# Patient Record
Sex: FEMALE | Employment: PART TIME | URBAN - METROPOLITAN AREA
[De-identification: names, ages, dates, MRNs, and addresses within clinical notes are randomized per-mention and may not be internally consistent; named-entity substitution may affect disease eponyms.]

---

## 2019-11-05 ENCOUNTER — HOSPITAL ENCOUNTER (OUTPATIENT)
Age: 29
Setting detail: SPECIMEN
Discharge: HOME | End: 2019-11-05
Payer: MEDICAID

## 2019-11-05 DIAGNOSIS — R53.83 FATIGUE, UNSPECIFIED TYPE: ICD-10-CM

## 2019-11-05 PROBLEM — Z97.5 IUD (INTRAUTERINE DEVICE) IN PLACE: Status: ACTIVE | Noted: 2018-08-23

## 2019-11-05 PROBLEM — Z86.19 HISTORY OF CHICKEN POX: Status: ACTIVE | Noted: 2017-07-03

## 2019-11-05 LAB
ABSOLUTE EOS #: 0.08 K/UL (ref 0–0.44)
ABSOLUTE IMMATURE GRANULOCYTE: <0.03 K/UL (ref 0–0.3)
ABSOLUTE LYMPH #: 1.89 K/UL (ref 1.1–3.7)
ABSOLUTE MONO #: 0.48 K/UL (ref 0.1–1.2)
BASOPHILS # BLD: 1 % (ref 0–2)
BASOPHILS ABSOLUTE: 0.03 K/UL (ref 0–0.2)
DIFFERENTIAL TYPE: ABNORMAL
EOSINOPHILS RELATIVE PERCENT: 1 % (ref 1–4)
HCT VFR BLD CALC: 42.7 % (ref 36.3–47.1)
HEMOGLOBIN: 13.6 G/DL (ref 11.9–15.1)
IMMATURE GRANULOCYTES: 0 %
LYMPHOCYTES # BLD: 34 % (ref 24–43)
MCH RBC QN AUTO: 31.1 PG (ref 25.2–33.5)
MCHC RBC AUTO-ENTMCNC: 31.9 G/DL (ref 28.4–34.8)
MCV RBC AUTO: 97.7 FL (ref 82.6–102.9)
MONOCYTES # BLD: 9 % (ref 3–12)
NRBC AUTOMATED: 0 PER 100 WBC
PDW BLD-RTO: 11 % (ref 11.8–14.4)
PLATELET # BLD: 233 K/UL (ref 138–453)
PLATELET ESTIMATE: ABNORMAL
PMV BLD AUTO: 11.7 FL (ref 8.1–13.5)
RBC # BLD: 4.37 M/UL (ref 3.95–5.11)
RBC # BLD: ABNORMAL 10*6/UL
SEG NEUTROPHILS: 55 % (ref 36–65)
SEGMENTED NEUTROPHILS ABSOLUTE COUNT: 3.04 K/UL (ref 1.5–8.1)
TSH SERPL DL<=0.05 MIU/L-ACNC: 0.51 MIU/L (ref 0.3–5)
VITAMIN D 25-HYDROXY: 45.2 NG/ML (ref 30–100)
WBC # BLD: 5.5 K/UL (ref 3.5–11.3)
WBC # BLD: ABNORMAL 10*3/UL

## 2019-11-06 LAB — VITAMIN B-12: 514 PG/ML (ref 232–1245)

## 2019-11-07 LAB — EBV EARLY ANTIGEN IGG: 63 U/ML

## 2021-12-06 LAB — PAP SMEAR, EXTERNAL: NEGATIVE

## 2023-07-19 LAB — MAMMOGRAPHY, EXTERNAL: NORMAL

## 2023-08-16 ENCOUNTER — HOSPITAL ENCOUNTER (OUTPATIENT)
Age: 33
Discharge: HOME | End: 2023-08-16

## 2023-08-16 PROCEDURE — 86481 TB AG RESPONSE T-CELL SUSP: CPT

## 2023-08-16 PROCEDURE — 86787 VARICELLA-ZOSTER ANTIBODY: CPT

## 2023-08-16 PROCEDURE — 86735 MUMPS ANTIBODY: CPT

## 2023-08-16 PROCEDURE — 86762 RUBELLA ANTIBODY: CPT

## 2023-08-16 PROCEDURE — 86765 RUBEOLA ANTIBODY: CPT

## 2023-08-17 LAB — RUBV IGG SERPL QL IA: 18.89 IU/ML

## 2023-08-18 LAB
MEV IGG SER-ACNC: 4.35
MUV IGG SER IA-ACNC: 4.48
VZV IGG SER QL IA: 2.64

## 2023-08-21 LAB — T-SPOT TB TEST: NORMAL

## 2023-09-27 LAB
CHOLEST SERPL-MCNC: 223 MG/DL (ref 0–199)
CHOLESTEROL/HDL RATIO: 3
GLUCOSE SERPL-MCNC: 91 MG/DL (ref 70–99)
HDLC SERPL-MCNC: 79 MG/DL (ref 0–40)
LDLC SERPL CALC-MCNC: 131 MG/DL (ref 0–100)
PATIENT FASTING?: YES
TRIGL SERPL-MCNC: 64 MG/DL (ref 0–149)
VLDLC SERPL CALC-MCNC: 13 MG/DL

## 2023-11-27 ENCOUNTER — NURSE TRIAGE (OUTPATIENT)
Dept: OTHER | Facility: CLINIC | Age: 33
End: 2023-11-27

## 2023-11-27 NOTE — TELEPHONE ENCOUNTER
Kaleigh Boudreaux  1990    Callback to employee reporting COVID status. Add associate health test results in workday  Quarantine 5 days from onset of symptoms  Strict Mask adherence 5 days after  May reuturn if symptoms are improving and without fever for 24 hours without use of fever reducing medication.

## 2024-01-24 ENCOUNTER — OFFICE VISIT (OUTPATIENT)
Dept: FAMILY MEDICINE CLINIC | Age: 34
End: 2024-01-24
Payer: COMMERCIAL

## 2024-01-24 VITALS
DIASTOLIC BLOOD PRESSURE: 74 MMHG | BODY MASS INDEX: 21 KG/M2 | TEMPERATURE: 98.9 F | SYSTOLIC BLOOD PRESSURE: 130 MMHG | WEIGHT: 123 LBS | HEIGHT: 64 IN | OXYGEN SATURATION: 98 % | HEART RATE: 82 BPM

## 2024-01-24 DIAGNOSIS — M25.512 CHRONIC LEFT SHOULDER PAIN: ICD-10-CM

## 2024-01-24 DIAGNOSIS — G89.29 CHRONIC LEFT SHOULDER PAIN: ICD-10-CM

## 2024-01-24 DIAGNOSIS — B35.9 TINEA: ICD-10-CM

## 2024-01-24 DIAGNOSIS — S16.1XXA STRAIN OF NECK MUSCLE, INITIAL ENCOUNTER: ICD-10-CM

## 2024-01-24 DIAGNOSIS — M25.552 PAIN OF LEFT HIP: Primary | ICD-10-CM

## 2024-01-24 PROCEDURE — 99203 OFFICE O/P NEW LOW 30 MIN: CPT | Performed by: PHYSICIAN ASSISTANT

## 2024-01-24 RX ORDER — OMEGA-3 FATTY ACIDS CAP DELAYED RELEASE 1000 MG 1000 MG
3000 CAPSULE DELAYED RELEASE ORAL
COMMUNITY

## 2024-01-24 SDOH — ECONOMIC STABILITY: HOUSING INSECURITY
IN THE LAST 12 MONTHS, WAS THERE A TIME WHEN YOU DID NOT HAVE A STEADY PLACE TO SLEEP OR SLEPT IN A SHELTER (INCLUDING NOW)?: NO

## 2024-01-24 SDOH — ECONOMIC STABILITY: FOOD INSECURITY: WITHIN THE PAST 12 MONTHS, YOU WORRIED THAT YOUR FOOD WOULD RUN OUT BEFORE YOU GOT MONEY TO BUY MORE.: NEVER TRUE

## 2024-01-24 SDOH — ECONOMIC STABILITY: INCOME INSECURITY: HOW HARD IS IT FOR YOU TO PAY FOR THE VERY BASICS LIKE FOOD, HOUSING, MEDICAL CARE, AND HEATING?: NOT HARD AT ALL

## 2024-01-24 SDOH — ECONOMIC STABILITY: FOOD INSECURITY: WITHIN THE PAST 12 MONTHS, THE FOOD YOU BOUGHT JUST DIDN'T LAST AND YOU DIDN'T HAVE MONEY TO GET MORE.: NEVER TRUE

## 2024-01-24 ASSESSMENT — PATIENT HEALTH QUESTIONNAIRE - PHQ9
2. FEELING DOWN, DEPRESSED OR HOPELESS: 0
SUM OF ALL RESPONSES TO PHQ QUESTIONS 1-9: 0
SUM OF ALL RESPONSES TO PHQ QUESTIONS 1-9: 0
1. LITTLE INTEREST OR PLEASURE IN DOING THINGS: 0
SUM OF ALL RESPONSES TO PHQ QUESTIONS 1-9: 0
SUM OF ALL RESPONSES TO PHQ9 QUESTIONS 1 & 2: 0
SUM OF ALL RESPONSES TO PHQ QUESTIONS 1-9: 0

## 2024-01-24 ASSESSMENT — ENCOUNTER SYMPTOMS
COLOR CHANGE: 0
COUGH: 0
BACK PAIN: 0

## 2024-01-24 NOTE — PROGRESS NOTES
Visit Information    Have you changed or started any medications since your last visit including any over-the-counter medicines, vitamins, or herbal medicines? no   Are you having any side effects from any of your medications? -  no  Have you stopped taking any of your medications? Is so, why? -  no    Have you seen any other physician or provider since your last visit? No  Have you had any other diagnostic tests since your last visit? No  Have you been seen in the emergency room and/or had an admission to a hospital since we last saw you? No  Have you had your routine dental cleaning in the past 6 months? no    Have you activated your [employer] account? If not, what are your barriers? No:      Patient Care Team:  Haydee Chowdary APRN - CNP as PCP - General (Nurse Practitioner)    Medical History Review  Past Medical, Family, and Social History reviewed and  contribute to the patient presenting condition    Health Maintenance   Topic Date Due    Hepatitis B vaccine (1 of 3 - 3-dose series) Never done    COVID-19 Vaccine (1) Never done    Varicella vaccine (1 of 2 - 2-dose childhood series) Never done    Depression Screen  Never done    HIV screen  Never done    Hepatitis C screen  Never done    HPV vaccine (2 - 3-dose SCDM series) 02/10/2022    Cervical cancer screen  Never done    Flu vaccine (1) Never done    DTaP/Tdap/Td vaccine (3 - Td or Tdap) 07/11/2025    Hepatitis A vaccine  Aged Out    Hib vaccine  Aged Out    Polio vaccine  Aged Out    Meningococcal (ACWY) vaccine  Aged Out    Pneumococcal 0-64 years Vaccine  Aged Out       
understanding. Reviewed health maintenance.  Instructedto continue current medications, diet and exercise.  Patient agreed with treatmentplan. Follow up as directed.       Please note that this chart was generated using voice recognition Dragon dictation software.  Although every effort was made to ensure the accuracy of this automated transcription, some errors in transcription may have occurred.     Electronically signed by KARSON KAUR PA-C on 1/24/2024 at 3:44 PM

## 2024-02-05 ENCOUNTER — PATIENT MESSAGE (OUTPATIENT)
Dept: FAMILY MEDICINE CLINIC | Age: 34
End: 2024-02-05

## 2024-02-05 RX ORDER — CLOTRIMAZOLE AND BETAMETHASONE DIPROPIONATE 10; .64 MG/G; MG/G
CREAM TOPICAL
Qty: 45 G | Refills: 0 | Status: SHIPPED | OUTPATIENT
Start: 2024-02-05

## 2024-02-05 NOTE — TELEPHONE ENCOUNTER
From: Kaleigh Boudreaux  To: Orquidea Fowler  Sent: 2/5/2024 9:28 AM EST  Subject: Rash/ringworm worsening    Randy Heard,  The infection I have on my hands is getting worse to the point where it doesn’t even look like ringworm anymore. I believe the hand  and gloves are making it much worse. Could I get something different than the over the counter stuff?   Thank you!

## 2024-06-26 ENCOUNTER — TELEPHONE (OUTPATIENT)
Dept: OBGYN CLINIC | Age: 34
End: 2024-06-26

## 2024-06-26 NOTE — TELEPHONE ENCOUNTER
Kaleigh Boudreaux calling reporting a positive pregnancy test.    Kaleigh Boudreaux is  a new patient.   If no: previous OBGYN [facility]  This is  Kaleigh Boudreaux's first pregnancy.        Kalegih Boudreaux last menstrual cycle: 5/24/24  Based on LMP patient is 4W5D weeks     Dose patient have any concerns?   []YES  [x]NO  If yes, please discuss concern with provider to determine if patient needs additional appointment.      Scheduling Instructions and Education  [x]If patient less than 8 weeks please schedule missed menses (30 mins) between 6-8 weeks. ALSO scheduled USN w/ IPV (w/ NURSE) to follow 2-3 weeks after missed menses   Patient education: Initial missed menses appointment will consist of a pregnancy test and to establish care and review previous births **if applicable**. There will NOT be an ultrasound at this first visit. Please review that the USN and IPV visit will be 2-3 weeks after patient's missed menses. At this appointment dating ultrasound will be complete, prenatal labs ordered, prenatal education completed, pelvic exam if indicated     Please document appointments scheduled during phone call   Missed menses date/provider: 7/31 10:30 LE  USN/IPV date:USN 8/5 1:00, IPV 1:45    Please forward telephone encounter to provider appointments are scheduled with prior to closing telephone encounter.

## 2024-07-31 ENCOUNTER — OFFICE VISIT (OUTPATIENT)
Dept: OBGYN CLINIC | Age: 34
End: 2024-07-31
Payer: COMMERCIAL

## 2024-07-31 ENCOUNTER — HOSPITAL ENCOUNTER (OUTPATIENT)
Age: 34
Setting detail: SPECIMEN
Discharge: HOME | End: 2024-07-31

## 2024-07-31 VITALS
DIASTOLIC BLOOD PRESSURE: 74 MMHG | BODY MASS INDEX: 21.65 KG/M2 | HEIGHT: 64 IN | WEIGHT: 126.8 LBS | SYSTOLIC BLOOD PRESSURE: 116 MMHG

## 2024-07-31 DIAGNOSIS — F32.A DEPRESSION, UNSPECIFIED DEPRESSION TYPE: ICD-10-CM

## 2024-07-31 DIAGNOSIS — R11.0 NAUSEA: ICD-10-CM

## 2024-07-31 DIAGNOSIS — N92.6 MISSED MENSES: ICD-10-CM

## 2024-07-31 DIAGNOSIS — N91.2 AMENORRHEA: Primary | ICD-10-CM

## 2024-07-31 DIAGNOSIS — N91.2 AMENORRHEA: ICD-10-CM

## 2024-07-31 LAB
AMPHET UR QL SCN: NEGATIVE
BARBITURATES UR QL SCN: NEGATIVE
BENZODIAZ UR QL: NEGATIVE
BILIRUB UR QL STRIP: NEGATIVE
CANNABINOIDS UR QL SCN: NEGATIVE
CLARITY UR: CLEAR
COCAINE UR QL SCN: NEGATIVE
COLOR UR: YELLOW
COMMENT: NORMAL
CONTROL: YES
FENTANYL UR QL: NEGATIVE
GLUCOSE UR STRIP-MCNC: NEGATIVE MG/DL
HGB UR QL STRIP.AUTO: NEGATIVE
KETONES UR STRIP-MCNC: NEGATIVE MG/DL
LEUKOCYTE ESTERASE UR QL STRIP: NEGATIVE
METHADONE UR QL: NEGATIVE
NITRITE UR QL STRIP: NEGATIVE
OPIATES UR QL SCN: NEGATIVE
OXYCODONE UR QL SCN: NEGATIVE
PCP UR QL SCN: NEGATIVE
PH UR STRIP: 7 [PH] (ref 5–8)
PREGNANCY TEST URINE, POC: POSITIVE
PROT UR STRIP-MCNC: NEGATIVE MG/DL
SP GR UR STRIP: 1 (ref 1–1.03)
TEST INFORMATION: NORMAL
UROBILINOGEN UR STRIP-ACNC: NORMAL EU/DL (ref 0–1)

## 2024-07-31 PROCEDURE — 81025 URINE PREGNANCY TEST: CPT | Performed by: NURSE PRACTITIONER

## 2024-07-31 PROCEDURE — 99203 OFFICE O/P NEW LOW 30 MIN: CPT | Performed by: NURSE PRACTITIONER

## 2024-07-31 NOTE — PROGRESS NOTES
[facility] [address]  [place] [id number]  Dept: [phone]    Missed Menses Intake    Subjective:    Patient Name:Kaleigh Boudreaux  Patient Age: 33 y.o.  Date of Visit: 2024    Kaleigh Boudreaux arrives for missed menses visit.   Kaleigh Boudreaux had a positive pregnancy test 24.  Kaleigh Boudreaux does not have concerns.   Planned Pregnancy: Yes  Partner/FOB name: Armando- spouse  Patient's last menstrual period was 2024., Regular menses: Yes  Estimate gestation age of LMP: 9w5d  Estimated due date based on LMP: 25  Patient Occupation: RN @ [employer]     OBGYN History:   Date of Last Pap Smear: pt 2023- normal   Number of Pregnancies: 1  Number of Live Births: 0  [] Vaginal Birth  []  Birth  [] Vaginal Birth after  delivery ()  [] History of High Risk Pregnancy(ies)  [] History of Birth Complications  [] Hx of infant with NICU stay    Past Medical History  Diabetes: No  Anxiety: No  Depression: No  Bipolar: No  High Blood Pressure:No  Stroke: No  Seizure: No  Deep Vein Thrombosis: No  Preeclampsia: No  Gestational Diabetes:No  Gestational Hypertension:No  Postpartum Hemorrhage: No  Bleeding Disorder: No  Sexually Transmitted Infections: No  Genital Herpes: No  History of chicken pox/varicella vaccine: Yes  Daily Medications: Yes  prenatal    Past Family History (first degree relative)  Family history of blood clots: No  Family history of diabetes:No  Family history of factor V: No  Family history (mother/sister) of preeclampsia in a previous pregnancy: No    Early 1 Hour Glucose Screening  [] BMI 30 or greater (if marked, positive screen)  Risk Factors (need more than 1 if BMI less than 30)  [] Physical inactivity   [] First degree relative with diabetes  [] High- risk race or ethnicity (, , ,  American, Pacific

## 2024-07-31 NOTE — PROGRESS NOTES
SUBJECTIVE:    Chief Complaint:  Chief Complaint   Patient presents with    Amenorrhea       HPI:    Kaleigh  is being seen today for missed menses.  She reports a  positive pregnancy test on 24.  This  is a planned pregnancy.  She is  accepting at this time.  LMP: 24      Sure and definite: Yes     28 day cycle: Yes    She was not on a contraceptive at the time of conception, had IUD removed 2024.  Estimated weeks gestation today : 9w5d  Tentative DEBBIE: 25    Relationship with FOB: S.O for 7 years     Subjective:      Mother's ethnicity:    Father's ethnicity:      She is complaining today of:   Pain: No  Cramping: Yes  Bleeding or spotting: No     Nausea: Yes  Vomiting:  only once yesterday    Breast enlargement/tenderness: Yes  Fatigue: Yes  Frequency of urination: No    Last PAP: 2024 states was normal At [facility] Hx of abnormal PAP: denies  Significant PMH-  Depression  - stable no current medications or therapist has been stable she states for while.   Previous high risk obstetrical history: N/A    OB History    Para Term  AB Living   0 0 0 0 0 0   SAB IAB Ectopic Molar Multiple Live Births   0 0 0 0 0 0       ROS was done and is negative except as documented in HPI.  History was reviewed as documented on Epic Navigator.  Objective:  Vitals:  Blood pressure 116/74, height 1.626 m (5' 4\"), weight 57.5 kg (126 lb 12.8 oz), last menstrual period 2024, not currently breastfeeding.  Constitutional: She is oriented to person, place, and time.  She appears well-developed and well-nourished and in no acute distress.   Cardiovascular: Normal rate and regular rhythm, no murmur, rub, or gallop    Pulmonary/Chest: Effort normal and breath sounds normal. No rales or wheezes.  No chest retraction.  Extremities: no clubbing, cyanosis, or edema  Neurological:  CN II - XII grossly intact; no focal neurological deficits  Psychiatric:  Well groomed, well

## 2024-08-01 LAB
MICROORGANISM SPEC CULT: NO GROWTH
SERVICE CMNT-IMP: NORMAL
SPECIMEN DESCRIPTION: NORMAL

## 2024-08-12 NOTE — PROGRESS NOTES
New Obstetric Intake     Patient Name:Kaleigh Boudreaux  Patient Age: 33 y.o.  Date of Visit: 2024  Patient's estimated gestational age at visit: 11w4d  Estimated Date of Delivery: 25    Subjective:    Any Concerns Today: no    OB History          1    Para   0    Term   0       0    AB   0    Living   0         SAB   0    IAB   0    Ectopic   0    Molar   0    Multiple   0    Live Births   0                [place] Score:   Postpartum Depression: Not on file      History of postpartum depression: no    Generalized Anxiety Screening:       No data to display              Interpretation of MAXX-7 score: 5-9 = mild anxiety, 10-14 = moderate anxiety, 15+ = severe anxiety. Recommend referral to behavioral health for scores 10 or greater.     Social Determinants of Health:   Financial Resource Strain: Low Risk  (2024)    Overall Financial Resource Strain (CARDIA)     Difficulty of Paying Living Expenses: Not hard at all      Food Insecurity: No Food Insecurity (2024)    Hunger Vital Sign     Worried About Running Out of Food in the Last Year: Never true     Ran Out of Food in the Last Year: Never true      Transportation Needs: No Transportation Needs (2024)    PRAPARE - Transportation     Lack of Transportation (Medical): No     Lack of Transportation (Non-Medical): No      Intimate Partner Violence: Not At Risk (2024)    Humiliation, Afraid, Rape, and Kick questionnaire     Fear of Current or Ex-Partner: No     Emotionally Abused: No     Physically Abused: No     Sexually Abused: No       Objective:      /80   Pulse 75   Wt 57.9 kg (127 lb 9.6 oz)   LMP 2024      Medications:  Current Outpatient Medications   Medication Sig Dispense Refill    Prenatal Vit-DSS-Fe Cbn-FA (PRENATAL ADVANTAGE PO) Take by mouth      magnesium gluconate (MAGONATE) 500 MG tablet Take 1 tablet by mouth 2 times daily      Omega-3 Fatty Acids (FISH OIL) 1000 MG CPDR Take 3 capsules

## 2024-08-13 ENCOUNTER — INITIAL PRENATAL (OUTPATIENT)
Dept: OBGYN CLINIC | Age: 34
End: 2024-08-13

## 2024-08-13 ENCOUNTER — HOSPITAL ENCOUNTER (OUTPATIENT)
Age: 34
Setting detail: SPECIMEN
Discharge: HOME | End: 2024-08-13

## 2024-08-13 VITALS
BODY MASS INDEX: 21.9 KG/M2 | SYSTOLIC BLOOD PRESSURE: 110 MMHG | WEIGHT: 127.6 LBS | HEART RATE: 75 BPM | DIASTOLIC BLOOD PRESSURE: 80 MMHG

## 2024-08-13 DIAGNOSIS — Z3A.11 11 WEEKS GESTATION OF PREGNANCY: Primary | ICD-10-CM

## 2024-08-13 DIAGNOSIS — Z3A.11 11 WEEKS GESTATION OF PREGNANCY: ICD-10-CM

## 2024-08-13 LAB
ABO + RH BLD: NORMAL
BLOOD GROUP ANTIBODIES SERPL: NEGATIVE
HCV AB SERPL QL IA: NONREACTIVE
HIV 1+2 AB+HIV1 P24 AG SERPL QL IA: NONREACTIVE

## 2024-08-13 PROCEDURE — 0500F INITIAL PRENATAL CARE VISIT: CPT | Performed by: NURSE PRACTITIONER

## 2024-08-13 RX ORDER — MAGNESIUM GLUCONATE 27 MG(500)
500 TABLET ORAL 2 TIMES DAILY
COMMUNITY

## 2024-08-13 SDOH — ECONOMIC STABILITY: INCOME INSECURITY: IN THE LAST 12 MONTHS, WAS THERE A TIME WHEN YOU WERE NOT ABLE TO PAY THE MORTGAGE OR RENT ON TIME?: NO

## 2024-08-13 SDOH — ECONOMIC STABILITY: FOOD INSECURITY: WITHIN THE PAST 12 MONTHS, YOU WORRIED THAT YOUR FOOD WOULD RUN OUT BEFORE YOU GOT MONEY TO BUY MORE.: NEVER TRUE

## 2024-08-13 SDOH — ECONOMIC STABILITY: FOOD INSECURITY: WITHIN THE PAST 12 MONTHS, THE FOOD YOU BOUGHT JUST DIDN'T LAST AND YOU DIDN'T HAVE MONEY TO GET MORE.: NEVER TRUE

## 2024-08-13 SDOH — ECONOMIC STABILITY: TRANSPORTATION INSECURITY
IN THE PAST 12 MONTHS, HAS THE LACK OF TRANSPORTATION KEPT YOU FROM MEDICAL APPOINTMENTS OR FROM GETTING MEDICATIONS?: NO

## 2024-08-13 ASSESSMENT — ANXIETY QUESTIONNAIRES
5. BEING SO RESTLESS THAT IT IS HARD TO SIT STILL: NOT AT ALL
GAD7 TOTAL SCORE: 5
1. FEELING NERVOUS, ANXIOUS, OR ON EDGE: NOT AT ALL
7. FEELING AFRAID AS IF SOMETHING AWFUL MIGHT HAPPEN: SEVERAL DAYS
3. WORRYING TOO MUCH ABOUT DIFFERENT THINGS: SEVERAL DAYS
2. NOT BEING ABLE TO STOP OR CONTROL WORRYING: SEVERAL DAYS
IF YOU CHECKED OFF ANY PROBLEMS ON THIS QUESTIONNAIRE, HOW DIFFICULT HAVE THESE PROBLEMS MADE IT FOR YOU TO DO YOUR WORK, TAKE CARE OF THINGS AT HOME, OR GET ALONG WITH OTHER PEOPLE: NOT DIFFICULT AT ALL
6. BECOMING EASILY ANNOYED OR IRRITABLE: MORE THAN HALF THE DAYS
4. TROUBLE RELAXING: NOT AT ALL

## 2024-08-13 ASSESSMENT — SOCIAL DETERMINANTS OF HEALTH (SDOH)
WITHIN THE LAST YEAR, HAVE YOU BEEN HUMILIATED OR EMOTIONALLY ABUSED IN OTHER WAYS BY YOUR PARTNER OR EX-PARTNER?: NO
WITHIN THE LAST YEAR, HAVE YOU BEEN AFRAID OF YOUR PARTNER OR EX-PARTNER?: NO
WITHIN THE LAST YEAR, HAVE YOU BEEN KICKED, HIT, SLAPPED, OR OTHERWISE PHYSICALLY HURT BY YOUR PARTNER OR EX-PARTNER?: NO
WITHIN THE LAST YEAR, HAVE TO BEEN RAPED OR FORCED TO HAVE ANY KIND OF SEXUAL ACTIVITY BY YOUR PARTNER OR EX-PARTNER?: NO
HOW HARD IS IT FOR YOU TO PAY FOR THE VERY BASICS LIKE FOOD, HOUSING, MEDICAL CARE, AND HEATING?: NOT HARD AT ALL

## 2024-08-14 LAB
BASOPHILS # BLD: 0.04 K/UL (ref 0–0.2)
BASOPHILS NFR BLD: 1 % (ref 0–2)
EOSINOPHIL # BLD: 0.1 K/UL (ref 0–0.44)
EOSINOPHILS RELATIVE PERCENT: 1 % (ref 1–4)
ERYTHROCYTE [DISTWIDTH] IN BLOOD BY AUTOMATED COUNT: 12 % (ref 11.8–14.4)
HBV SURFACE AG SERPL QL IA: NONREACTIVE
HCT VFR BLD AUTO: 37.9 % (ref 36.3–47.1)
HGB BLD-MCNC: 12.1 G/DL (ref 11.9–15.1)
IMM GRANULOCYTES # BLD AUTO: 0.05 K/UL (ref 0–0.3)
IMM GRANULOCYTES NFR BLD: 1 %
LYMPHOCYTES NFR BLD: 0.96 K/UL (ref 1.1–3.7)
LYMPHOCYTES RELATIVE PERCENT: 11 % (ref 24–43)
MCH RBC QN AUTO: 31.8 PG (ref 25.2–33.5)
MCHC RBC AUTO-ENTMCNC: 31.9 G/DL (ref 28.4–34.8)
MCV RBC AUTO: 99.5 FL (ref 82.6–102.9)
MONOCYTES NFR BLD: 0.6 K/UL (ref 0.1–1.2)
MONOCYTES NFR BLD: 7 % (ref 3–12)
NEUTROPHILS NFR BLD: 79 % (ref 36–65)
NEUTS SEG NFR BLD: 7.04 K/UL (ref 1.5–8.1)
NRBC BLD-RTO: 0 PER 100 WBC
PLATELET # BLD AUTO: 255 K/UL (ref 138–453)
PMV BLD AUTO: 10.6 FL (ref 8.1–13.5)
RBC # BLD AUTO: 3.81 M/UL (ref 3.95–5.11)
RUBV IGG SERPL QL IA: 24.3 IU/ML
T PALLIDUM AB SER QL IA: NONREACTIVE
VZV IGG SER QL IA: 1.86
WBC OTHER # BLD: 8.8 K/UL (ref 3.5–11.3)

## 2024-08-16 LAB
CHLAMYDIA DNA UR QL NAA+PROBE: NEGATIVE
N GONORRHOEA DNA UR QL NAA+PROBE: NEGATIVE
SPECIMEN DESCRIPTION: NORMAL

## 2024-09-16 ENCOUNTER — ROUTINE PRENATAL (OUTPATIENT)
Dept: OBGYN CLINIC | Age: 34
End: 2024-09-16

## 2024-09-16 ENCOUNTER — HOSPITAL ENCOUNTER (OUTPATIENT)
Age: 34
Setting detail: SPECIMEN
Discharge: HOME | End: 2024-09-16

## 2024-09-16 VITALS
SYSTOLIC BLOOD PRESSURE: 118 MMHG | WEIGHT: 126 LBS | HEIGHT: 64 IN | BODY MASS INDEX: 21.51 KG/M2 | DIASTOLIC BLOOD PRESSURE: 82 MMHG

## 2024-09-16 DIAGNOSIS — Z34.92 NORMAL PREGNANCY, SECOND TRIMESTER: Primary | ICD-10-CM

## 2024-09-16 DIAGNOSIS — R10.9 ABDOMINAL PAIN DURING PREGNANCY IN SECOND TRIMESTER: ICD-10-CM

## 2024-09-16 DIAGNOSIS — Z3A.16 16 WEEKS GESTATION OF PREGNANCY: ICD-10-CM

## 2024-09-16 DIAGNOSIS — O26.892 ABDOMINAL PAIN DURING PREGNANCY IN SECOND TRIMESTER: ICD-10-CM

## 2024-09-16 LAB
BACTERIA URNS QL MICRO: ABNORMAL
BILIRUB UR QL STRIP: NEGATIVE
CASTS #/AREA URNS LPF: ABNORMAL /LPF (ref 0–8)
CLARITY UR: CLEAR
COLOR UR: YELLOW
EPI CELLS #/AREA URNS HPF: ABNORMAL /HPF (ref 0–5)
GLUCOSE UR STRIP-MCNC: NEGATIVE MG/DL
HGB UR QL STRIP.AUTO: NEGATIVE
KETONES UR STRIP-MCNC: NEGATIVE MG/DL
LEUKOCYTE ESTERASE UR QL STRIP: ABNORMAL
NITRITE UR QL STRIP: NEGATIVE
PH UR STRIP: 7 [PH] (ref 5–8)
PROT UR STRIP-MCNC: NEGATIVE MG/DL
RBC #/AREA URNS HPF: ABNORMAL /HPF (ref 0–4)
SP GR UR STRIP: 1.01 (ref 1–1.03)
UROBILINOGEN UR STRIP-ACNC: NORMAL EU/DL (ref 0–1)
WBC #/AREA URNS HPF: ABNORMAL /HPF (ref 0–5)

## 2024-09-16 PROCEDURE — 0502F SUBSEQUENT PRENATAL CARE: CPT | Performed by: NURSE PRACTITIONER

## 2024-09-17 LAB
MICROORGANISM SPEC CULT: NO GROWTH
SERVICE CMNT-IMP: NORMAL
SPECIMEN DESCRIPTION: NORMAL

## 2024-10-15 ENCOUNTER — ROUTINE PRENATAL (OUTPATIENT)
Dept: PERINATAL CARE | Age: 34
End: 2024-10-15
Payer: COMMERCIAL

## 2024-10-15 ENCOUNTER — HOSPITAL ENCOUNTER (OUTPATIENT)
Age: 34
Setting detail: SPECIMEN
Discharge: HOME | End: 2024-10-15

## 2024-10-15 ENCOUNTER — ROUTINE PRENATAL (OUTPATIENT)
Dept: OBGYN CLINIC | Age: 34
End: 2024-10-15
Payer: COMMERCIAL

## 2024-10-15 VITALS
HEART RATE: 91 BPM | WEIGHT: 134.48 LBS | RESPIRATION RATE: 16 BRPM | TEMPERATURE: 97.8 F | HEIGHT: 64 IN | SYSTOLIC BLOOD PRESSURE: 133 MMHG | DIASTOLIC BLOOD PRESSURE: 81 MMHG | BODY MASS INDEX: 22.96 KG/M2

## 2024-10-15 VITALS — BODY MASS INDEX: 23.31 KG/M2 | WEIGHT: 135.8 LBS | SYSTOLIC BLOOD PRESSURE: 119 MMHG | DIASTOLIC BLOOD PRESSURE: 76 MMHG

## 2024-10-15 DIAGNOSIS — Z36.86 ENCOUNTER FOR SCREENING FOR RISK OF PRE-TERM LABOR: ICD-10-CM

## 2024-10-15 DIAGNOSIS — N89.8 VAGINAL DISCHARGE: ICD-10-CM

## 2024-10-15 DIAGNOSIS — O44.40 LOW IMPLANTATION OF PLACENTA WITHOUT HEMORRHAGE: ICD-10-CM

## 2024-10-15 DIAGNOSIS — Z3A.20 20 WEEKS GESTATION OF PREGNANCY: Primary | ICD-10-CM

## 2024-10-15 DIAGNOSIS — O35.BXX0 FETAL VENTRICULAR SEPTAL DEFECT AFFECTING ANTEPARTUM CARE OF MOTHER, SINGLE OR UNSPECIFIED FETUS: ICD-10-CM

## 2024-10-15 DIAGNOSIS — O35.03X0 CHOROID PLEXUS CYST OF FETUS AFFECTING CARE OF MOTHER, ANTEPARTUM, SINGLE OR UNSPECIFIED FETUS: ICD-10-CM

## 2024-10-15 DIAGNOSIS — O16.2 HYPERTENSION AFFECTING PREGNANCY IN SECOND TRIMESTER: ICD-10-CM

## 2024-10-15 DIAGNOSIS — Z34.92 ENCOUNTER FOR SUPERVISION OF LOW-RISK PREGNANCY IN SECOND TRIMESTER: ICD-10-CM

## 2024-10-15 DIAGNOSIS — O35.BXX0 FETAL VENTRICULAR SEPTAL DEFECT AFFECTING ANTEPARTUM CARE OF MOTHER, SINGLE OR UNSPECIFIED FETUS: Primary | ICD-10-CM

## 2024-10-15 DIAGNOSIS — O10.919 CHRONIC HYPERTENSION AFFECTING PREGNANCY: ICD-10-CM

## 2024-10-15 DIAGNOSIS — Z3A.20 20 WEEKS GESTATION OF PREGNANCY: ICD-10-CM

## 2024-10-15 PROBLEM — Z97.5 IUD (INTRAUTERINE DEVICE) IN PLACE: Status: RESOLVED | Noted: 2018-08-23 | Resolved: 2024-10-15

## 2024-10-15 LAB
CANDIDA SPECIES: NEGATIVE
GARDNERELLA VAGINALIS: NEGATIVE
SOURCE: NORMAL
TRICHOMONAS: NEGATIVE

## 2024-10-15 PROCEDURE — 76817 TRANSVAGINAL US OBSTETRIC: CPT | Performed by: OBSTETRICS & GYNECOLOGY

## 2024-10-15 PROCEDURE — 90472 IMMUNIZATION ADMIN EACH ADD: CPT | Performed by: STUDENT IN AN ORGANIZED HEALTH CARE EDUCATION/TRAINING PROGRAM

## 2024-10-15 PROCEDURE — 0502F SUBSEQUENT PRENATAL CARE: CPT | Performed by: STUDENT IN AN ORGANIZED HEALTH CARE EDUCATION/TRAINING PROGRAM

## 2024-10-15 PROCEDURE — 90471 IMMUNIZATION ADMIN: CPT | Performed by: STUDENT IN AN ORGANIZED HEALTH CARE EDUCATION/TRAINING PROGRAM

## 2024-10-15 PROCEDURE — 76811 OB US DETAILED SNGL FETUS: CPT | Performed by: OBSTETRICS & GYNECOLOGY

## 2024-10-15 PROCEDURE — 99999 PR OFFICE/OUTPT VISIT,PROCEDURE ONLY: CPT | Performed by: OBSTETRICS & GYNECOLOGY

## 2024-10-15 PROCEDURE — 90661 CCIIV3 VAC ABX FR 0.5 ML IM: CPT | Performed by: STUDENT IN AN ORGANIZED HEALTH CARE EDUCATION/TRAINING PROGRAM

## 2024-10-15 NOTE — PROGRESS NOTES
I would advise initiation of daily oral baby aspirin 81 mg based on guidelines by the USPSTF/ACOG (for preeclampsia prevention for pregnant women at \"high-risk\"  for preeclampsia).        Options with respect to offering the patient invasive genetic prenatal testing, non-invasive prenatal testing (NIPT/NIPS), maternal carrier genetic screen, and MSAFP screen could not be completed today, as the patient declines a Maternal-Fetal Medicine physician consultation today.     Patient declines a Maternal-Fetal Medicine complete physician consultation today regarding the fetal and/or maternal medical/obstetrical complications/co-morbidities of pregnancy.      Maternal-Fetal Medicine (MFM) attending physician will defer all management for these medical/obstetrical complications of pregnancy to the primary attending obstetrical physician/provider, as a result.  Therefore, only an ultrasound evaluation was completed today in the MFM office.      Please refer to Maternal-Fetal Medicine OBGYN resident progress note in EPIC.

## 2024-10-15 NOTE — PROGRESS NOTES
Obstetric/Gynecology Maternal Fetal Medicine Resident Note    Patient is informed of finding of choroid plexus cysts and VSD seen on ultrasound today. Patient declines formal consultation with MFM attending physician.     Based upon chart review from blood pressures in Care Everywhere tab in EPIC, patient is diagnosed with chronic hypertension at this visit.    Casandra Everett MD  OBGYN Resident, PGY2  [facility]  10/15/2024, 9:06 AM

## 2024-10-15 NOTE — PROGRESS NOTES
The patient requested to have the Flu vaccine. Consent obtained. Injection of 0.5mL given Left deltoid Intramuscular.  Lot #: [id number]  EXP: 06/17/2025  Patient tolerated well.     Vaccine Information Sheet, \"Influenza - Inactivated\"  given to Kaleigh Boudreaux, or parent/legal guardian of  Kaleigh Boudreaux and verbalized understanding.    Patient responses:    Have you ever had a reaction to a flu vaccine? Yes  Are you able to eat eggs without adverse effects?  Yes  Do you have any current illness?  No  Have you ever had Guillian Sparks Syndrome?  No    Flu vaccine given per order. Please see immunization tab.

## 2024-10-15 NOTE — PROGRESS NOTES
Prenatal Visit    Kaleigh Boudreaux is a 34 y.o. female  at 20w4d    The patient was seen and evaluated. She has no complaints. There was positive fetal movements. She denies contractions, vaginal bleeding and leakage of fluid. Signs and symptoms of  labor as well as labor were reviewed. The S/S of Pre-Eclampsia were reviewed with the patient in detail. She is to report any of these if they occur. She currently denies any of these.    Reviewed MFM ultrasound findings with the patient and her partner in regards to fetal choroid plexus cysts (with open hands) and suspected fetal VSD. Also discussed low-lying placenta. Patient understands she has follow up for these findings for fetal echo and repeat imaging as the pregnancy progresses. Discussed NIPT as a screening test for chromosomal abnormalities as well as its limitation. Patient declined at this time. She understands it is offerable throughout the pregnancy as well as amniocentesis.     Also reports vaginal discharge. Is unsure if due to pregnancy or possible yeast. Patient will self swab.      Discussed resident involvement in triage and labor and delivery.  Discussed call group.  Patient verbalized understanding and agreement.    Vitals:  /76 (Site: Left Upper Arm, Position: Sitting, Cuff Size: Medium Adult)   Wt 61.6 kg (135 lb 12.8 oz)   LMP 2024   BMI 23.31 kg/m²       Assessment & Plan:  Kaleigh Boudreaux is a 34 y.o. female  at 20w4d   - 28 week labs future   - The patients anatomy ultrasound has been completed and reviewed with patient.    - Influenza and Covid vaccinations recommended per ACOG: R/B/A discussed with increased risk of both maternal and fetal morbidity and mortality in unvaccinated pregnant patients.   - Continue prenatal vitamin   - Warning signs reviewed and recommendations when to call or present to the hospital if she experiences signs or symptoms of  labor and pre-eclampsia were

## 2024-11-11 ENCOUNTER — ROUTINE PRENATAL (OUTPATIENT)
Dept: OBGYN CLINIC | Age: 34
End: 2024-11-11

## 2024-11-11 ENCOUNTER — ROUTINE PRENATAL (OUTPATIENT)
Dept: PERINATAL CARE | Age: 34
End: 2024-11-11
Payer: COMMERCIAL

## 2024-11-11 VITALS
HEART RATE: 88 BPM | WEIGHT: 140 LBS | SYSTOLIC BLOOD PRESSURE: 120 MMHG | RESPIRATION RATE: 16 BRPM | BODY MASS INDEX: 23.9 KG/M2 | HEIGHT: 64 IN | DIASTOLIC BLOOD PRESSURE: 60 MMHG | TEMPERATURE: 98.2 F

## 2024-11-11 VITALS — BODY MASS INDEX: 24.03 KG/M2 | SYSTOLIC BLOOD PRESSURE: 122 MMHG | DIASTOLIC BLOOD PRESSURE: 62 MMHG | WEIGHT: 140 LBS

## 2024-11-11 DIAGNOSIS — O16.2 HYPERTENSION AFFECTING PREGNANCY IN SECOND TRIMESTER: ICD-10-CM

## 2024-11-11 DIAGNOSIS — Z34.92 ENCOUNTER FOR SUPERVISION OF LOW-RISK PREGNANCY IN SECOND TRIMESTER: ICD-10-CM

## 2024-11-11 DIAGNOSIS — O35.03X0 CHOROID PLEXUS CYST OF FETUS AFFECTING CARE OF MOTHER, ANTEPARTUM, SINGLE OR UNSPECIFIED FETUS: ICD-10-CM

## 2024-11-11 DIAGNOSIS — O35.BXX0 FETAL VENTRICULAR SEPTAL DEFECT AFFECTING ANTEPARTUM CARE OF MOTHER, SINGLE OR UNSPECIFIED FETUS: Primary | ICD-10-CM

## 2024-11-11 DIAGNOSIS — O44.40 LOW IMPLANTATION OF PLACENTA WITHOUT HEMORRHAGE: ICD-10-CM

## 2024-11-11 DIAGNOSIS — Z3A.24 24 WEEKS GESTATION OF PREGNANCY: ICD-10-CM

## 2024-11-11 DIAGNOSIS — Z03.73 SUSPECTED FETAL ANOMALY NOT FOUND: ICD-10-CM

## 2024-11-11 DIAGNOSIS — O10.919 CHRONIC HYPERTENSION AFFECTING PREGNANCY: ICD-10-CM

## 2024-11-11 DIAGNOSIS — O35.BXX0 FETAL VENTRICULAR SEPTAL DEFECT AFFECTING ANTEPARTUM CARE OF MOTHER, SINGLE OR UNSPECIFIED FETUS: ICD-10-CM

## 2024-11-11 DIAGNOSIS — Z3A.24 24 WEEKS GESTATION OF PREGNANCY: Primary | ICD-10-CM

## 2024-11-11 DIAGNOSIS — Z36.4 ULTRASOUND FOR ANTENATAL SCREENING FOR FETAL GROWTH RESTRICTION: ICD-10-CM

## 2024-11-11 PROCEDURE — 76816 OB US FOLLOW-UP PER FETUS: CPT | Performed by: OBSTETRICS & GYNECOLOGY

## 2024-11-11 PROCEDURE — 0502F SUBSEQUENT PRENATAL CARE: CPT | Performed by: STUDENT IN AN ORGANIZED HEALTH CARE EDUCATION/TRAINING PROGRAM

## 2024-11-11 PROCEDURE — 76817 TRANSVAGINAL US OBSTETRIC: CPT | Performed by: OBSTETRICS & GYNECOLOGY

## 2024-11-11 PROCEDURE — 76827 ECHO EXAM OF FETAL HEART: CPT | Performed by: OBSTETRICS & GYNECOLOGY

## 2024-11-11 PROCEDURE — 76820 UMBILICAL ARTERY ECHO: CPT | Performed by: OBSTETRICS & GYNECOLOGY

## 2024-11-11 PROCEDURE — 93325 DOPPLER ECHO COLOR FLOW MAPG: CPT | Performed by: OBSTETRICS & GYNECOLOGY

## 2024-11-11 PROCEDURE — 76825 ECHO EXAM OF FETAL HEART: CPT | Performed by: OBSTETRICS & GYNECOLOGY

## 2024-11-11 PROCEDURE — 99999 PR OFFICE/OUTPT VISIT,PROCEDURE ONLY: CPT | Performed by: OBSTETRICS & GYNECOLOGY

## 2024-11-11 NOTE — PROGRESS NOTES
Declined  [] Known negative CF/SMA/Fragile X  [] Results **    Baby aspirin screen:  []Positive  [x]Negative    Early 1 hour GTT:  []Yes  [x] No    AFP:  []Ordered  []Completed    Anatomy scan:  [x]Referral Sent 8/13/2024 KATRIN MONTANA LPN   [x]Scheduled   [x]Completed low lying 13 mm placenta. choroid plexus (open hands seen). suspected VSD  [x] Follow up in 4 weeks    Fetal Echo:   [x] Yes  [] No    High Risk Factors:  Depression  - stable no current medications or therapist has been stable she states forwhile.   Fetal anomalies  -choroid plexus cysts > bilateral open hands seen  -suspected VSD > follow up echo  -NIPT offered *  cHTN (no meds)   -diagnosed at [facility]  -BP in care everywhere when patient was seen for flank pain    []Placed on High Risk List    Fetal Surveillance:  [x] Yes  [] No    Covid Vaccine:10/21  Flu Vaccine:  [x]Given 10/15/24  [] Declined   Tdap:  []Given **  [] Declined **  Rhogam: O+  []Given   [x] Not indicated     1hr GTT:  [] Results **  3hr GTT:    []Breast Pump Order Signed/Sent    36 weeks US:  []Completed     GBS:  [] Positive   [] Negative from **    Apts with :  [] Date: ** Gestational Age: **  [] Date: ** Gestational Age: **    Apts with :  [x] Date: 10/16 Gestational Age: 20w5d        The problem list reflects the active issues addressed during today's visit  Patient Active Problem List    Diagnosis Date Noted    Fetal VSD 10/15/2024     Seen on [facility] US 10/15      cHTN (no meds) 10/15/2024     Based upon chart review from blood pressures in Care Everywhere tab in EPIC, patient is diagnosed with chronic hypertension at [facility] 10/15/24      History of chicken pox 07/03/2017     Return in about 4 weeks (around 12/9/2024) for routine ob.      DO Roberta Moreno OBGYN  [id number]  [facility], [id number]  11/11/2024, 11:38 AM

## 2024-11-11 NOTE — PROGRESS NOTES
Options with respect to offering the patient invasive genetic prenatal testing, non-invasive prenatal testing (NIPT/NIPS), maternal carrier genetic screen, antepartum referral for pediatric cardiology consultation, and MSAFP screen could not be completed, as the patient previously declined a Maternal-Fetal Medicine physician consultation.     Advise   echocardiogram and pediatric cardiology consultation (given the fetal cardiac findings today).     Patient previously declined a Maternal-Fetal Medicine complete physician consultation regarding the fetal and/or maternal medical/obstetrical complications/co-morbidities of pregnancy.      Maternal-Fetal Medicine (MFM) attending physician will defer all management for these medical/obstetrical complications of pregnancy to the primary attending obstetrical physician/provider, as a result.  Therefore, only an ultrasound evaluation was completed today in the MFM office.

## 2024-12-09 ENCOUNTER — ROUTINE PRENATAL (OUTPATIENT)
Dept: PERINATAL CARE | Age: 34
End: 2024-12-09
Payer: COMMERCIAL

## 2024-12-09 VITALS
HEIGHT: 64 IN | DIASTOLIC BLOOD PRESSURE: 66 MMHG | RESPIRATION RATE: 16 BRPM | BODY MASS INDEX: 24.59 KG/M2 | WEIGHT: 144 LBS | SYSTOLIC BLOOD PRESSURE: 106 MMHG | HEART RATE: 84 BPM | TEMPERATURE: 97.7 F

## 2024-12-09 DIAGNOSIS — O35.BXX0 FETAL VENTRICULAR SEPTAL DEFECT AFFECTING ANTEPARTUM CARE OF MOTHER, SINGLE OR UNSPECIFIED FETUS: ICD-10-CM

## 2024-12-09 DIAGNOSIS — Z03.73 SUSPECTED FETAL ANOMALY NOT FOUND: ICD-10-CM

## 2024-12-09 DIAGNOSIS — O35.03X0 CHOROID PLEXUS CYST OF FETUS AFFECTING CARE OF MOTHER, ANTEPARTUM, SINGLE OR UNSPECIFIED FETUS: ICD-10-CM

## 2024-12-09 DIAGNOSIS — Z03.72 SUSPECTED PLACENTAL PROBLEM NOT FOUND: ICD-10-CM

## 2024-12-09 DIAGNOSIS — Z36.4 ULTRASOUND FOR ANTENATAL SCREENING FOR FETAL GROWTH RESTRICTION: ICD-10-CM

## 2024-12-09 DIAGNOSIS — Z36.3 ENCOUNTER FOR ROUTINE SCREENING FOR MALFORMATION USING ULTRASONICS: ICD-10-CM

## 2024-12-09 DIAGNOSIS — Z3A.28 28 WEEKS GESTATION OF PREGNANCY: ICD-10-CM

## 2024-12-09 DIAGNOSIS — O16.3 HYPERTENSION AFFECTING PREGNANCY IN THIRD TRIMESTER: Primary | ICD-10-CM

## 2024-12-09 DIAGNOSIS — O44.40 LOW IMPLANTATION OF PLACENTA WITHOUT HEMORRHAGE: ICD-10-CM

## 2024-12-09 PROCEDURE — 76819 FETAL BIOPHYS PROFIL W/O NST: CPT | Performed by: OBSTETRICS & GYNECOLOGY

## 2024-12-09 PROCEDURE — 99999 PR OFFICE/OUTPT VISIT,PROCEDURE ONLY: CPT | Performed by: OBSTETRICS & GYNECOLOGY

## 2024-12-09 PROCEDURE — 76820 UMBILICAL ARTERY ECHO: CPT | Performed by: OBSTETRICS & GYNECOLOGY

## 2024-12-09 PROCEDURE — 76805 OB US >/= 14 WKS SNGL FETUS: CPT | Performed by: OBSTETRICS & GYNECOLOGY

## 2024-12-09 PROCEDURE — 76817 TRANSVAGINAL US OBSTETRIC: CPT | Performed by: OBSTETRICS & GYNECOLOGY

## 2024-12-11 ENCOUNTER — ROUTINE PRENATAL (OUTPATIENT)
Dept: OBGYN CLINIC | Age: 34
End: 2024-12-11

## 2024-12-11 VITALS — WEIGHT: 143 LBS | BODY MASS INDEX: 24.55 KG/M2 | DIASTOLIC BLOOD PRESSURE: 70 MMHG | SYSTOLIC BLOOD PRESSURE: 114 MMHG

## 2024-12-11 DIAGNOSIS — Z3A.28 28 WEEKS GESTATION OF PREGNANCY: Primary | ICD-10-CM

## 2024-12-11 PROCEDURE — 0502F SUBSEQUENT PRENATAL CARE: CPT | Performed by: OBSTETRICS & GYNECOLOGY

## 2024-12-11 NOTE — PROGRESS NOTES
Kaleigh Boudreaux is a 34 y.o. female 28w5d        OB History    Para Term  AB Living   1 0 0 0 0 0   SAB IAB Ectopic Molar Multiple Live Births   0 0 0 0 0 0      # Outcome Date GA Lbr Wagner/2nd Weight Sex Type Anes PTL Lv   1 Current                Vitals  BP: 114/70  Weight - Scale: 64.9 kg (143 lb)      The patient was seen and evaluated. There was positive fetal movements. No contractions or leakage of fluid. Signs and symptoms of  labor as well as labor were reviewed. The S/S of Pre-Eclampsia were reviewed with the patient in detail. She is to report any of these if they occur. She currently denies any of these.    The patient had her 28 week labs ordered.  No visits with results within 5 Week(s) from this visit.   Latest known visit with results is:   Hospital Outpatient Visit on 10/15/2024   Component Date Value Ref Range Status    Source 10/15/2024 .VAGINAL SWAB   Final    Trichomonas 10/15/2024 NEGATIVE  NEGATIVE Final    for Trichomonas Vaginalis    GARDNERELLA VAGINALIS 10/15/2024 NEGATIVE  NEGATIVE Final    for Gardnerella vaginalis    Julianna species 10/15/2024 NEGATIVE  NEGATIVE Final    Comment: for Candida sp.  Method of testing is a DNA probe intended for detection and identification of Candida   species, Gardnerella vaginalis, and Trichomonas vaginalis nucleic acid in vaginal fluid   specimens from patients with symptoms of vaginitis/vaginosis.     ]    *Will need  testing set up for cHTN diagnosed at [facility] [place] - Counseled in detail 24; discussed ACOG recommendation for fetal surveillance.  She is electing for  every 2 weeks and declining weekly. She understands potential risks of fetal/maternal safety and understands delivery window for cHTN*    Insurance: Umr    IPV bag/mug given:2024 KATRIN MONTANA LPN   Genetic Information given/reviewed: 2024 Cynthia Coello CNP   1st trimester education packet given:2024 KATRIN MONTANA 
Is This A New Presentation, Or A Follow-Up?: Prominent Veins
How Severe Are They?: moderate

## 2024-12-12 ENCOUNTER — HOSPITAL ENCOUNTER (OUTPATIENT)
Age: 34
Discharge: HOME | End: 2024-12-12
Payer: COMMERCIAL

## 2024-12-12 DIAGNOSIS — Z3A.24 24 WEEKS GESTATION OF PREGNANCY: ICD-10-CM

## 2024-12-12 LAB
AMOUNT GLUCOSE GIVEN: NORMAL G
BASOPHILS # BLD: 0.08 K/UL (ref 0–0.2)
BASOPHILS NFR BLD: 1 % (ref 0–2)
EOSINOPHIL # BLD: 0.13 K/UL (ref 0–0.44)
EOSINOPHILS RELATIVE PERCENT: 1 % (ref 1–4)
ERYTHROCYTE [DISTWIDTH] IN BLOOD BY AUTOMATED COUNT: 12 % (ref 11.8–14.4)
GLUCOSE 3H P 100 G GLC PO SERPL-MCNC: 112 MG/DL
HCT VFR BLD AUTO: 36 % (ref 36.3–47.1)
HGB BLD-MCNC: 11.9 G/DL (ref 11.9–15.1)
IMM GRANULOCYTES # BLD AUTO: 0.35 K/UL (ref 0–0.3)
IMM GRANULOCYTES NFR BLD: 3 %
LYMPHOCYTES NFR BLD: 2.1 K/UL (ref 1.1–3.7)
LYMPHOCYTES RELATIVE PERCENT: 16 % (ref 24–43)
MCH RBC QN AUTO: 32.6 PG (ref 25.2–33.5)
MCHC RBC AUTO-ENTMCNC: 33.1 G/DL (ref 28.4–34.8)
MCV RBC AUTO: 98.6 FL (ref 82.6–102.9)
MONOCYTES NFR BLD: 0.9 K/UL (ref 0.1–1.2)
MONOCYTES NFR BLD: 7 % (ref 3–12)
NEUTROPHILS NFR BLD: 72 % (ref 36–65)
NEUTS SEG NFR BLD: 9.32 K/UL (ref 1.5–8.1)
NRBC BLD-RTO: 0 PER 100 WBC
PLATELET # BLD AUTO: 251 K/UL (ref 138–453)
PMV BLD AUTO: 10 FL (ref 8.1–13.5)
RBC # BLD AUTO: 3.65 M/UL (ref 3.95–5.11)
WBC OTHER # BLD: 12.9 K/UL (ref 3.5–11.3)

## 2024-12-12 PROCEDURE — 87086 URINE CULTURE/COLONY COUNT: CPT

## 2024-12-12 PROCEDURE — 82947 ASSAY GLUCOSE BLOOD QUANT: CPT

## 2024-12-12 PROCEDURE — 36415 COLL VENOUS BLD VENIPUNCTURE: CPT

## 2024-12-12 PROCEDURE — 85025 COMPLETE CBC W/AUTO DIFF WBC: CPT

## 2024-12-13 LAB
MICROORGANISM SPEC CULT: NO GROWTH
SERVICE CMNT-IMP: NORMAL
SPECIMEN DESCRIPTION: NORMAL

## 2025-01-02 ENCOUNTER — TELEPHONE (OUTPATIENT)
Dept: OBGYN CLINIC | Age: 35
End: 2025-01-02

## 2025-01-02 NOTE — TELEPHONE ENCOUNTER
Attempted to schedule  testing but no answer- left message to call back to schedule bi weekly testing

## 2025-01-16 ENCOUNTER — ROUTINE PRENATAL (OUTPATIENT)
Dept: OBGYN CLINIC | Age: 35
End: 2025-01-16
Payer: COMMERCIAL

## 2025-01-16 VITALS
BODY MASS INDEX: 24.55 KG/M2 | HEART RATE: 80 BPM | SYSTOLIC BLOOD PRESSURE: 128 MMHG | WEIGHT: 143 LBS | DIASTOLIC BLOOD PRESSURE: 70 MMHG

## 2025-01-16 DIAGNOSIS — Z3A.33 33 WEEKS GESTATION OF PREGNANCY: ICD-10-CM

## 2025-01-16 DIAGNOSIS — Z23 NEED FOR DIPHTHERIA-TETANUS-PERTUSSIS (TDAP) VACCINE: ICD-10-CM

## 2025-01-16 DIAGNOSIS — O10.919 CHRONIC HYPERTENSION AFFECTING PREGNANCY: ICD-10-CM

## 2025-01-16 DIAGNOSIS — O09.93 HRP (HIGH RISK PREGNANCY), THIRD TRIMESTER: Primary | ICD-10-CM

## 2025-01-16 DIAGNOSIS — O35.BXX0 FETAL VENTRICULAR SEPTAL DEFECT AFFECTING ANTEPARTUM CARE OF MOTHER, SINGLE OR UNSPECIFIED FETUS: ICD-10-CM

## 2025-01-16 PROCEDURE — 0502F SUBSEQUENT PRENATAL CARE: CPT | Performed by: ADVANCED PRACTICE MIDWIFE

## 2025-01-16 NOTE — PROGRESS NOTES
Gestational age 33w6d  Indications chtn, fetal vsd  NST started 2:19  /70  Pulse 80  Weight 143lb  Patient recording movements. Patient stated fetal movement active no issues

## 2025-01-16 NOTE — PROGRESS NOTES
Tdap given in left deltoid patient tolerated well. Will be back next week for follow up NST/BPP/LETY/Growth.     NDC: 20344-643-21  LOT:   EXP:  3/22/2027

## 2025-01-16 NOTE — PROGRESS NOTES
SUBJECTIVE:    Kaleigh is here for her return OB visit. denies concerns today.   She reports  feeling fetal movement.  She denies vaginal bleeding.  She denies vaginal discharge.  She denies leaking of fluid.  She denies uterine cramping.  She denies  nausea and/or vomiting.    OBJECTIVE:  Blood pressure 128/70, pulse 80, weight 64.9 kg (143 lb), last menstrual period 05/24/2024, not currently breastfeeding.    Total weight gain: 6.804 kg (15 lb)    NST:   Baseline:  135  Variability:  Moderate  Accelerations:  Present  Decelerations: Absent   Fetal Movement:  Perceived by patient during NST  Contractions: patient denies contractions, contractions Absent  on toco.  Interpretation: Reactive (Category1)     BPP completed during appointment: Yes and 8/8     Vaccinations:   [x]Accepted tdap  []Declined   [] Undecided   [] Educated on recommendations    ASSESSMENT/PLAN:  IUP @ 33+6 weeks  S=D    High Risk Pregnancy  Due date is based on LMP and confirmed with 12w1d early dating ultrasound  Patient's prenatal labs are completed.  Patient's blood type O positive and rhogam is not indicated in the pregnancy.   Early glucola indicated: no  Patient Declined genetic screening.   Anatomy scan scheduled 10/15/24 completed. Placenta anterior,normal cord insertion: Yes cervical length 3.55 cm, low lying placenta PRESENT  and 13mm from cervical os, no placenta previa . Additional findings: yes, bilateral fetal choroid plexus cysts. VSD   USN 12/19/25 presentation cephalic. LETY 12.03. BPP 8/8 EFW 59%. NO fetal choroid plexus cysts. VSD noted. NO low lying  Second trimester 24-28 week labs:   [x] Ordered  [x] Completed 12/12/24  [x] Glucose screening 112  [x] Recommendations pass  Total weight gain in pregnancy reviewed: Yes  Fetal Kick Count was discussed and explained. She was instructed to lay on her left side 20 minutes after eating and count movements for up to 2 hours with a target value of 10 movements. Instructed to notify

## 2025-01-21 ENCOUNTER — ROUTINE PRENATAL (OUTPATIENT)
Dept: OBGYN CLINIC | Age: 35
End: 2025-01-21
Payer: COMMERCIAL

## 2025-01-21 VITALS
SYSTOLIC BLOOD PRESSURE: 130 MMHG | DIASTOLIC BLOOD PRESSURE: 80 MMHG | WEIGHT: 147.2 LBS | HEART RATE: 66 BPM | BODY MASS INDEX: 25.27 KG/M2

## 2025-01-21 DIAGNOSIS — O09.93 HRP (HIGH RISK PREGNANCY), THIRD TRIMESTER: Primary | ICD-10-CM

## 2025-01-21 DIAGNOSIS — Z3A.34 34 WEEKS GESTATION OF PREGNANCY: ICD-10-CM

## 2025-01-21 DIAGNOSIS — O09.93 HRP (HIGH RISK PREGNANCY), THIRD TRIMESTER: ICD-10-CM

## 2025-01-21 DIAGNOSIS — O10.919 CHRONIC HYPERTENSION AFFECTING PREGNANCY: ICD-10-CM

## 2025-01-21 DIAGNOSIS — O35.BXX0 FETAL VENTRICULAR SEPTAL DEFECT AFFECTING ANTEPARTUM CARE OF MOTHER, SINGLE OR UNSPECIFIED FETUS: ICD-10-CM

## 2025-01-21 DIAGNOSIS — O16.3 HYPERTENSION AFFECTING PREGNANCY IN THIRD TRIMESTER: ICD-10-CM

## 2025-01-21 PROCEDURE — 59025 FETAL NON-STRESS TEST: CPT | Performed by: NURSE PRACTITIONER

## 2025-01-21 PROCEDURE — 0502F SUBSEQUENT PRENATAL CARE: CPT | Performed by: NURSE PRACTITIONER

## 2025-01-21 NOTE — PATIENT INSTRUCTIONS
stop-smoking programs and medicines. These can increase your chances of quitting for good.  When should you call for help?  Call 911 anytime you think you may need emergency care. For example, call if:    You passed out (lost consciousness).     You have severe vaginal bleeding.     You have severe pain in your belly or pelvis.     You have had fluid gushing or leaking from your vagina and you know or think the umbilical cord is bulging into your vagina. If this happens, immediately get down on your knees so your rear end (buttocks) is higher than your head. This will decrease the pressure on the cord until help arrives.    Call your doctor now or seek immediate medical care if:    You have signs of preeclampsia, such as:  Sudden swelling of your face, hands, or feet.  New vision problems (such as dimness or blurring).  A severe headache.     You have any vaginal bleeding.     You have belly pain or cramping.     You have a fever.     You have had regular contractions (with or without pain) for an hour. This means that you have 6 or more within 1 hour after you change your position and drink fluids.     You have a sudden release of fluid from the vagina.     You have low back pain or pelvic pressure that does not go away.     You notice that your baby has stopped moving or is moving much less than normal.    Watch closely for changes in your health, and be sure to contact your doctor if you have any problems.  Where can you learn more?  Go to [email or web address] and sign in to your [employer] account. Enter Q400 in the Search Health Information box to learn more about \" Labor: Care Instructions.\"     If you do not have an account, please click on the \"Sign Up Now\" link.  Current as of: 2018  Content Version: 12.0  © 4254-4144 [employer], [employer]. Care instructions adapted under license by [employer]. If you have questions about a medical condition or this instruction,

## 2025-01-21 NOTE — PROGRESS NOTES
Gestational age 34w4d  Indications htn, fvsd  NST started 3:40  /80  Pulse 66  Weight 147.2lb  Patient recording movements. Patient stated fetal movement active, insomnia

## 2025-01-21 NOTE — PROGRESS NOTES
S:  Here for NST for fetal surveillance secondary to high risk pregnancy cHTN    *Will need  testing set up for cHTN diagnosed at [facility] - Counseled in detail 24; discussed ACOG recommendation for fetal surveillance.  She is electing for  every 2 weeks and declining weekly. She understands potential risks of fetal/maternal safety and understands delivery window for cHTN*    Insurance: Umr    IPV bag/mug given:2024 KATRIN MONTANA LPN   Genetic Information given/reviewed: 2024 Cynthia Coello CNP   1st trimester education packet given:2024 KATRIN MONTANA LPN   2nd trimester education packet given:  3rd trimester education packet given:      FOB Name:  Pt knows gender - ITS A GIRL    Last Pap Smear: States 2024 Mercy Wang  [x] Urine collected for culture 2024 Emma Merlos MA    [x] Negative date 24   [] Positive date   [x] UDS collected 2024 > negative  [x] Gc/ct collected 2024 KATRIN MONTANA LPN   [x] Prenatal Labs completed  Malathi Zamudio DO     Dating based on   [x] LMP  [] USN  Reviewed by (provider) Malathi Zamudio DO     Genetic Screening:  []Accepted NIPS vs FTS   [x] Undecided   [x]Declined   []Completed  [] Low risk   [] Abn for **    Carrier Screening:  [x] Undecided   [] Accepted   [x] Declined  [] Known negative CF/SMA/Fragile X  [] Results **    Baby aspirin screen:  []Positive  [x]Negative    Early 1 hour GTT:  []Yes  [x] No    AFP:  []Ordered  []Completed    Anatomy scan:  [x]Referral Sent 2024 KATRIN MONTANA LPN   [x]Scheduled   [x]Completed low lying 13 mm placenta. choroid plexus (open hands seen). suspected VSD  [x] Follow up in 4 weeks    Fetal Echo:   [x] Yes  [] No    High Risk Factors:  Depression  - stable no current medications or therapist has been stable she states forwhile.   Fetal anomalies  -choroid plexus cysts > bilateral open hands seen  -suspected VSD > follow up echo > VSD noted  -NIPT offered *  cHTN

## 2025-01-27 ENCOUNTER — ROUTINE PRENATAL (OUTPATIENT)
Dept: OBGYN CLINIC | Age: 35
End: 2025-01-27

## 2025-01-27 VITALS
WEIGHT: 150.38 LBS | DIASTOLIC BLOOD PRESSURE: 62 MMHG | BODY MASS INDEX: 25.81 KG/M2 | SYSTOLIC BLOOD PRESSURE: 110 MMHG

## 2025-01-27 DIAGNOSIS — O09.93 HRP (HIGH RISK PREGNANCY), THIRD TRIMESTER: Primary | ICD-10-CM

## 2025-01-27 DIAGNOSIS — O10.919 CHRONIC HYPERTENSION AFFECTING PREGNANCY: ICD-10-CM

## 2025-01-27 DIAGNOSIS — O35.BXX0 FETAL VENTRICULAR SEPTAL DEFECT AFFECTING ANTEPARTUM CARE OF MOTHER, SINGLE OR UNSPECIFIED FETUS: ICD-10-CM

## 2025-01-27 DIAGNOSIS — O16.3 HYPERTENSION AFFECTING PREGNANCY IN THIRD TRIMESTER: ICD-10-CM

## 2025-01-27 NOTE — PROGRESS NOTES
delivery. Route of delivery and counseling on vaginal, operative vaginal, and  sections were completed with the risks of each to both the patient as well as her baby. The possibility of a blood transfusion was discussed as well.   - Counseled on types of analgesia during labor, the need to choose her pediatrician, and postpartum plans for contraception  - Counseled on the call ahead policy. She has been instructed to call the office at anytime prior to going into the hospital if possible. Exceptions were reviewed including but not limited to: decreased fetal movement, vaginal bleeding, trauma, readily expectant delivery, or any instance where she feels 911 should be utilized.      Return in about 1 week (around 2/3/2025) for routine ob, NST.      DO Roberta Moreno OBGYN  [id number]  [place], [address]  2025, 3:32 PM

## 2025-02-06 ENCOUNTER — ROUTINE PRENATAL (OUTPATIENT)
Dept: OBGYN CLINIC | Age: 35
End: 2025-02-06
Payer: COMMERCIAL

## 2025-02-06 ENCOUNTER — HOSPITAL ENCOUNTER (OUTPATIENT)
Age: 35
Setting detail: SPECIMEN
Discharge: HOME | End: 2025-02-06

## 2025-02-06 VITALS — WEIGHT: 151 LBS | DIASTOLIC BLOOD PRESSURE: 68 MMHG | BODY MASS INDEX: 25.92 KG/M2 | SYSTOLIC BLOOD PRESSURE: 130 MMHG

## 2025-02-06 DIAGNOSIS — O35.BXX0 FETAL VENTRICULAR SEPTAL DEFECT AFFECTING ANTEPARTUM CARE OF MOTHER, SINGLE OR UNSPECIFIED FETUS: ICD-10-CM

## 2025-02-06 DIAGNOSIS — Z3A.36 36 WEEKS GESTATION OF PREGNANCY: ICD-10-CM

## 2025-02-06 DIAGNOSIS — O09.93 HRP (HIGH RISK PREGNANCY), THIRD TRIMESTER: Primary | ICD-10-CM

## 2025-02-06 DIAGNOSIS — O16.3 HYPERTENSION AFFECTING PREGNANCY IN THIRD TRIMESTER: ICD-10-CM

## 2025-02-06 DIAGNOSIS — O09.93 HRP (HIGH RISK PREGNANCY), THIRD TRIMESTER: ICD-10-CM

## 2025-02-06 PROCEDURE — 0502F SUBSEQUENT PRENATAL CARE: CPT | Performed by: ADVANCED PRACTICE MIDWIFE

## 2025-02-06 PROCEDURE — 59025 FETAL NON-STRESS TEST: CPT | Performed by: ADVANCED PRACTICE MIDWIFE

## 2025-02-06 NOTE — PROGRESS NOTES
SUBJECTIVE:  Chaperone for Intimate Exam  Chaperone was offered as part of the rooming process. Patient declined and agrees to continue with exam without a chaperone.  Chaperone: n/a     Kaleigh is here for her return OB visit. denies concerns today.   She reports  feeling fetal movement.  She denies vaginal bleeding.  She denies vaginal discharge.  She denies leaking of fluid.  She denies uterine cramping.  She denies  nausea and/or vomiting.    OBJECTIVE:  Blood pressure 130/68, weight 68.5 kg (151 lb), last menstrual period 05/24/2024, not currently breastfeeding.    Total weight gain: 10.4 kg (23 lb)    NST:   Baseline:  115  Variability:  Moderate  Accelerations:  Present  Decelerations: Absent   Fetal Movement:  Perceived by patient during NST  Contractions: patient denies contractions  Interpretation: Reactive (Category1)     BPP completed during appointment: Yes and 8/8      Vaccinations:   [x]Accepted tdap  []Declined   [] Undecided   [] Educated on recommendations    ASSESSMENT/PLAN:  IUP @ 36+6 weeks  S=D    High Risk Pregnancy  Due date is based on LMP and confirmed with 12w1d early dating ultrasound  Patient's prenatal labs are completed.  Patient's blood type O positive and rhogam is not indicated in the pregnancy.   Early glucola indicated: no  Patient Declined genetic screening.   Anatomy scan scheduled 10/15/24 completed. Placenta anterior,normal cord insertion: Yes cervical length 3.55 cm, low lying placenta PRESENT  and 13mm from cervical os, no placenta previa . Additional findings: yes, bilateral fetal choroid plexus cysts. VSD   USN 12/19/25 presentation cephalic. LETY 12.03. BPP 8/8 EFW 59%. NO fetal choroid plexus cysts. VSD noted. NO low lying  Second trimester 24-28 week labs:   [x] Ordered  [x] Completed 12/12/24  [x] Glucose screening 112  [x] Recommendations pass  Total weight gain in pregnancy reviewed: Yes  Fetal Kick Count was discussed and explained. She was instructed to lay on her

## 2025-02-07 DIAGNOSIS — O35.BXX0 FETAL VENTRICULAR SEPTAL DEFECT AFFECTING ANTEPARTUM CARE OF MOTHER, SINGLE OR UNSPECIFIED FETUS: ICD-10-CM

## 2025-02-07 DIAGNOSIS — O10.919 CHRONIC HYPERTENSION AFFECTING PREGNANCY: ICD-10-CM

## 2025-02-07 DIAGNOSIS — O09.93 HRP (HIGH RISK PREGNANCY), THIRD TRIMESTER: ICD-10-CM

## 2025-02-09 LAB
MICROORGANISM SPEC CULT: NORMAL
SERVICE CMNT-IMP: NORMAL
SPECIMEN DESCRIPTION: NORMAL

## 2025-02-11 ENCOUNTER — ROUTINE PRENATAL (OUTPATIENT)
Dept: OBGYN CLINIC | Age: 35
End: 2025-02-11
Payer: COMMERCIAL

## 2025-02-11 VITALS — WEIGHT: 149.5 LBS | BODY MASS INDEX: 25.66 KG/M2 | DIASTOLIC BLOOD PRESSURE: 64 MMHG | SYSTOLIC BLOOD PRESSURE: 122 MMHG

## 2025-02-11 DIAGNOSIS — O09.93 HRP (HIGH RISK PREGNANCY), THIRD TRIMESTER: Primary | ICD-10-CM

## 2025-02-11 DIAGNOSIS — Z3A.37 37 WEEKS GESTATION OF PREGNANCY: ICD-10-CM

## 2025-02-11 DIAGNOSIS — O09.93 HRP (HIGH RISK PREGNANCY), THIRD TRIMESTER: ICD-10-CM

## 2025-02-11 DIAGNOSIS — O10.919 CHRONIC HYPERTENSION AFFECTING PREGNANCY: ICD-10-CM

## 2025-02-11 DIAGNOSIS — O35.BXX0 FETAL VENTRICULAR SEPTAL DEFECT AFFECTING ANTEPARTUM CARE OF MOTHER, SINGLE OR UNSPECIFIED FETUS: ICD-10-CM

## 2025-02-11 PROCEDURE — 0502F SUBSEQUENT PRENATAL CARE: CPT | Performed by: STUDENT IN AN ORGANIZED HEALTH CARE EDUCATION/TRAINING PROGRAM

## 2025-02-11 PROCEDURE — 59025 FETAL NON-STRESS TEST: CPT | Performed by: STUDENT IN AN ORGANIZED HEALTH CARE EDUCATION/TRAINING PROGRAM

## 2025-02-11 NOTE — PROGRESS NOTES
Rhogam: O+  []Given   [x] Not indicated     1hr GTT: passed  [x] Results 112  3hr GTT: n/a    []Breast Pump Order Signed/Sent    36 weeks US:  [x]Completed     GBS:  [] Positive   [x] Negative     Apts with :  [x] Date:  Gestational Age: 28w5d  [] Date: ** Gestational Age: **    Apts with :  [x] Date: 10/16 Gestational Age: 20w5d   [x] Date: 2024  Gestational Age: 24w3d   [x] Date: 2025  Gestational Age: 35w3d   [x] Date: 2025  Gestational Age: 37w4d       Counseling  - Reviewed recommendations when to call or present to the hospital if experiencing signs or symptoms of  labor or pre-eclampsia.  - Counseled on labor and delivery. Route of delivery and counseling on vaginal, operative vaginal, and  sections were completed with the risks of each to both the patient as well as her baby. The possibility of a blood transfusion was discussed as well.   - Counseled on types of analgesia during labor, the need to choose her pediatrician, and postpartum plans for contraception  - Counseled on the call ahead policy. She has been instructed to call the office at anytime prior to going into the hospital if possible. Exceptions were reviewed including but not limited to: decreased fetal movement, vaginal bleeding, trauma, readily expectant delivery, or any instance where she feels 911 should be utilized.      Return in about 1 week (around 2025) for routine ob, NST.    DO Roberta Esposito OBGYN  [address]    [address]  [place], [address]  2025, 3:02 PM

## 2025-02-19 ENCOUNTER — ROUTINE PRENATAL (OUTPATIENT)
Dept: OBGYN CLINIC | Age: 35
End: 2025-02-19

## 2025-02-19 ENCOUNTER — HOSPITAL ENCOUNTER (OUTPATIENT)
Age: 35
Discharge: HOME | End: 2025-02-19
Attending: OBSTETRICS & GYNECOLOGY | Admitting: OBSTETRICS & GYNECOLOGY
Payer: COMMERCIAL

## 2025-02-19 VITALS — DIASTOLIC BLOOD PRESSURE: 85 MMHG | RESPIRATION RATE: 18 BRPM | SYSTOLIC BLOOD PRESSURE: 134 MMHG | HEART RATE: 88 BPM

## 2025-02-19 DIAGNOSIS — O09.93 HRP (HIGH RISK PREGNANCY), THIRD TRIMESTER: Primary | ICD-10-CM

## 2025-02-19 DIAGNOSIS — O10.919 CHRONIC HYPERTENSION AFFECTING PREGNANCY: ICD-10-CM

## 2025-02-19 DIAGNOSIS — O35.BXX0 FETAL VENTRICULAR SEPTAL DEFECT AFFECTING ANTEPARTUM CARE OF MOTHER, SINGLE OR UNSPECIFIED FETUS: ICD-10-CM

## 2025-02-19 DIAGNOSIS — Z3A.38 38 WEEKS GESTATION OF PREGNANCY: ICD-10-CM

## 2025-02-19 PROCEDURE — 0502F SUBSEQUENT PRENATAL CARE: CPT | Performed by: STUDENT IN AN ORGANIZED HEALTH CARE EDUCATION/TRAINING PROGRAM

## 2025-02-19 PROCEDURE — 99212 OFFICE O/P EST SF 10 MIN: CPT

## 2025-02-19 RX ORDER — ONDANSETRON 2 MG/ML
4 INJECTION INTRAMUSCULAR; INTRAVENOUS EVERY 6 HOURS PRN
Status: DISCONTINUED | OUTPATIENT
Start: 2025-02-19 | End: 2025-02-19 | Stop reason: HOSPADM

## 2025-02-19 RX ORDER — ONDANSETRON 4 MG/1
4 TABLET, ORALLY DISINTEGRATING ORAL EVERY 8 HOURS PRN
Status: DISCONTINUED | OUTPATIENT
Start: 2025-02-19 | End: 2025-02-19 | Stop reason: HOSPADM

## 2025-02-19 RX ORDER — ACETAMINOPHEN 500 MG
1000 TABLET ORAL EVERY 6 HOURS PRN
Status: DISCONTINUED | OUTPATIENT
Start: 2025-02-19 | End: 2025-02-19 | Stop reason: HOSPADM

## 2025-02-19 NOTE — PROGRESS NOTES
Prenatal Visit    Kaleigh Boudreaux is a 34 y.o. female  at 38w4d    Positive fetal movements  Negative contractions, vaginal bleeding, loss of fluid.    Discussed resident involvement in triage and labor and delivery.  Discussed call group.  Patient verbalized understanding and agreement.      Assessment & Plan:  Kaleigh Boudreaux is a 34 y.o. female  at 38w4d   - 28 week labs completed   - Influenza and Covid vaccinations recommended per ACOG: R/B/A discussed with increased risk of both maternal and fetal morbidity and mortality in unvaccinated pregnant patients.   - TDAP and RSV vaccinations recommended per ACOG. R/B/A discussed. She understands must received RSV vaccine at pharmacy.   - Continue prenatal vitamin   - BPP performed prior to NST today and significant for 4/8 off for movement and tone despite 30 minutes test. Patient has been feeling baby move up until this time. States very active at 2 AM. Recommend labor and delivery for extended monitoring. On call physician, charge RN, and residents notified.     *Will need  testing set up for cHTN diagnosed at [facility] - Counseled in detail 24; discussed ACOG recommendation for fetal surveillance.  She is electing for  every 2 weeks and declining weekly. She understands potential risks of fetal/maternal safety and understands delivery window for cHTN*    IOL 8am 24      FOB Name:  Pt knows gender - ITS A GIRL    Last Pap Smear: States 2024 Mercy Wang  [x] Urine collected for culture 2024 Emma Merlos MA    [x] Negative date 24   [] Positive date   [x] UDS collected 2024 > negative  [x] Gc/ct collected 2024 KATRIN MONTANA LPN   [x] Prenatal Labs completed  Malathi Zamudio DO     Dating based on   [x] LMP  [] USN  Reviewed by (provider) Malathi Zamudio DO     Genetic Screening:  []Accepted NIPS vs FTS   [x] Undecided   [x]Declined   []Completed  [] Low risk   [] Abn for

## 2025-02-19 NOTE — PROGRESS NOTES
Gestational age 38W5D  Indications VSD, CHTN  NST started @  BP @  Pulse @  Weight @  Patient recording movements. Patient stated fetal movement @

## 2025-02-19 NOTE — H&P
OBSTETRICAL HISTORY AND PHYSICAL  [facility]    Date: 2025       Time: 1:16 PM   Patient Name: Kaleigh Boudreaux     Patient : 1990  [facility] [address]    Admission Date/Time: 2025 10:21 AM      CC: BPP 4/8 (movement, tone)     HPI: Kaleigh Boudreaux is a 34 y.o.  at 38w5d presents to L&D after getting a BPP 4/8 in primary OB office today. Patient receives biweekly BPP and NSTs in the office for cHTN (no meds). Patient reports positive fetal movement. Denies contractions, loss of fluid, vaginal bleeding, vaginal discharge and urinary complaints.    Patient denies any fever, chills, N/V, headaches, vision changes, chest pain, shortness of breath, RUQ pain, abdominal pain, and increased swelling/tenderness in bilateral lower extremities.    DATING:  LMP: Patient's last menstrual period was 2024.  Estimated Date of Delivery: 25   Based on: LMP    PREGNANCY RISK FACTORS:  Patient Active Problem List   Diagnosis    History of chicken pox    Fetal VSD    cHTN (no meds)    38 weeks gestation of pregnancy        Steroids Given In This Pregnancy:  no     REVIEW OF SYSTEMS:  Constitutional: negative fever, negative chills, negative weight changes   HEENT: negative visual disturbances, negative headaches, negative dizziness, negative hearing loss  Breast: Negative breast abnormalities, negative breast lumps, negative nipple discharge  Respiratory: negative dyspnea, negative cough, negative SOB  Cardiovascular: negative chest pain, negative palpitations, negative arrhythmia, negative syncope   Gastrointestinal: negative abdominal pain, negative RUQ pain, negative N/V, negative diarrhea, negative constipation, negative bowel changes, negative heartburn   Genitourinary: negative dysuria, negative hematuria, negative urinary incontinence, negative vaginal discharge, negative vaginal bleeding or spotting  Dermatological: negative rash, negative pruritis,

## 2025-02-26 ENCOUNTER — HOSPITAL ENCOUNTER (INPATIENT)
Age: 35
LOS: 3 days | Discharge: HOME | End: 2025-03-01
Attending: OBSTETRICS & GYNECOLOGY | Admitting: STUDENT IN AN ORGANIZED HEALTH CARE EDUCATION/TRAINING PROGRAM
Payer: COMMERCIAL

## 2025-02-26 PROBLEM — Z3A.39 39 WEEKS GESTATION OF PREGNANCY: Status: ACTIVE | Noted: 2025-02-26

## 2025-02-26 LAB
ABO + RH BLD: NORMAL
ALBUMIN SERPL-MCNC: 3.9 G/DL (ref 3.5–5.2)
ALBUMIN/GLOB SERPL: 1.3 {RATIO} (ref 1–2.5)
ALP SERPL-CCNC: 199 U/L (ref 35–104)
ALT SERPL-CCNC: 20 U/L (ref 10–35)
AMPHET UR QL SCN: NEGATIVE
ANION GAP SERPL CALCULATED.3IONS-SCNC: 12 MMOL/L (ref 9–16)
ARM BAND NUMBER: NORMAL
AST SERPL-CCNC: 30 U/L (ref 10–35)
BARBITURATES UR QL SCN: NEGATIVE
BASOPHILS # BLD: 0.08 K/UL (ref 0–0.2)
BASOPHILS NFR BLD: 1 % (ref 0–2)
BENZODIAZ UR QL: NEGATIVE
BILIRUB SERPL-MCNC: 0.5 MG/DL (ref 0–1.2)
BLOOD BANK SAMPLE EXPIRATION: NORMAL
BLOOD GROUP ANTIBODIES SERPL: NEGATIVE
BUN SERPL-MCNC: 11 MG/DL (ref 6–20)
CALCIUM SERPL-MCNC: 9 MG/DL (ref 8.6–10.4)
CANNABINOIDS UR QL SCN: NEGATIVE
CHLORIDE SERPL-SCNC: 106 MMOL/L (ref 98–107)
CO2 SERPL-SCNC: 19 MMOL/L (ref 20–31)
COCAINE UR QL SCN: NEGATIVE
CREAT SERPL-MCNC: 0.9 MG/DL (ref 0.6–0.9)
CREAT UR-MCNC: 41 MG/DL (ref 28–217)
EOSINOPHIL # BLD: 0.16 K/UL (ref 0–0.44)
EOSINOPHILS RELATIVE PERCENT: 1 % (ref 1–4)
ERYTHROCYTE [DISTWIDTH] IN BLOOD BY AUTOMATED COUNT: 12.6 % (ref 11.8–14.4)
FENTANYL UR QL: NEGATIVE
GFR, ESTIMATED: 87 ML/MIN/1.73M2
GLUCOSE SERPL-MCNC: 80 MG/DL (ref 74–99)
HCT VFR BLD AUTO: 37.9 % (ref 36.3–47.1)
HGB BLD-MCNC: 12.6 G/DL (ref 11.9–15.1)
IMM GRANULOCYTES # BLD AUTO: 0.27 K/UL (ref 0–0.3)
IMM GRANULOCYTES NFR BLD: 2 %
LYMPHOCYTES NFR BLD: 2.08 K/UL (ref 1.1–3.7)
LYMPHOCYTES RELATIVE PERCENT: 15 % (ref 24–43)
MCH RBC QN AUTO: 32.1 PG (ref 25.2–33.5)
MCHC RBC AUTO-ENTMCNC: 33.2 G/DL (ref 28.4–34.8)
MCV RBC AUTO: 96.4 FL (ref 82.6–102.9)
METHADONE UR QL: NEGATIVE
MONOCYTES NFR BLD: 0.99 K/UL (ref 0.1–1.2)
MONOCYTES NFR BLD: 7 % (ref 3–12)
NEUTROPHILS NFR BLD: 74 % (ref 36–65)
NEUTS SEG NFR BLD: 10.22 K/UL (ref 1.5–8.1)
NRBC BLD-RTO: 0 PER 100 WBC
OPIATES UR QL SCN: NEGATIVE
OXYCODONE UR QL SCN: NEGATIVE
PCP UR QL SCN: NEGATIVE
PLATELET # BLD AUTO: 205 K/UL (ref 138–453)
PMV BLD AUTO: 10.8 FL (ref 8.1–13.5)
POTASSIUM SERPL-SCNC: 4.1 MMOL/L (ref 3.7–5.3)
PROT SERPL-MCNC: 7 G/DL (ref 6.6–8.7)
RBC # BLD AUTO: 3.93 M/UL (ref 3.95–5.11)
SODIUM SERPL-SCNC: 137 MMOL/L (ref 136–145)
T PALLIDUM AB SER QL IA: NONREACTIVE
TEST INFORMATION: NORMAL
TOTAL PROTEIN, URINE: 16 MG/DL
URINE TOTAL PROTEIN CREATININE RATIO: 0.39 (ref 0–0.2)
WBC OTHER # BLD: 13.8 K/UL (ref 3.5–11.3)

## 2025-02-26 PROCEDURE — 85025 COMPLETE CBC W/AUTO DIFF WBC: CPT

## 2025-02-26 PROCEDURE — 2580000003 HC RX 258

## 2025-02-26 PROCEDURE — 86850 RBC ANTIBODY SCREEN: CPT

## 2025-02-26 PROCEDURE — 82570 ASSAY OF URINE CREATININE: CPT

## 2025-02-26 PROCEDURE — 86901 BLOOD TYPING SEROLOGIC RH(D): CPT

## 2025-02-26 PROCEDURE — 2500000003 HC RX 250 WO HCPCS

## 2025-02-26 PROCEDURE — 80053 COMPREHEN METABOLIC PANEL: CPT

## 2025-02-26 PROCEDURE — 86900 BLOOD TYPING SEROLOGIC ABO: CPT

## 2025-02-26 PROCEDURE — 80307 DRUG TEST PRSMV CHEM ANLYZR: CPT

## 2025-02-26 PROCEDURE — 84156 ASSAY OF PROTEIN URINE: CPT

## 2025-02-26 PROCEDURE — 86780 TREPONEMA PALLIDUM: CPT

## 2025-02-26 PROCEDURE — 1220000000 HC SEMI PRIVATE OB R&B

## 2025-02-26 RX ORDER — SODIUM CHLORIDE, SODIUM LACTATE, POTASSIUM CHLORIDE, CALCIUM CHLORIDE 600; 310; 30; 20 MG/100ML; MG/100ML; MG/100ML; MG/100ML
INJECTION, SOLUTION INTRAVENOUS CONTINUOUS
Status: DISCONTINUED | OUTPATIENT
Start: 2025-02-26 | End: 2025-02-27

## 2025-02-26 RX ORDER — SIMETHICONE 80 MG
80 TABLET,CHEWABLE ORAL EVERY 6 HOURS PRN
Status: DISCONTINUED | OUTPATIENT
Start: 2025-02-26 | End: 2025-02-27

## 2025-02-26 RX ORDER — SODIUM CHLORIDE 0.9 % (FLUSH) 0.9 %
5-40 SYRINGE (ML) INJECTION EVERY 12 HOURS SCHEDULED
Status: DISCONTINUED | OUTPATIENT
Start: 2025-02-26 | End: 2025-02-27

## 2025-02-26 RX ORDER — ACETAMINOPHEN 500 MG
1000 TABLET ORAL EVERY 6 HOURS PRN
Status: DISCONTINUED | OUTPATIENT
Start: 2025-02-26 | End: 2025-02-27

## 2025-02-26 RX ORDER — DIPHENHYDRAMINE HYDROCHLORIDE 50 MG/ML
25 INJECTION INTRAMUSCULAR; INTRAVENOUS EVERY 4 HOURS PRN
Status: DISCONTINUED | OUTPATIENT
Start: 2025-02-26 | End: 2025-02-27

## 2025-02-26 RX ORDER — DIPHENHYDRAMINE HCL 25 MG
25 TABLET ORAL EVERY 4 HOURS PRN
Status: DISCONTINUED | OUTPATIENT
Start: 2025-02-26 | End: 2025-02-27

## 2025-02-26 RX ORDER — SODIUM CHLORIDE 9 MG/ML
INJECTION, SOLUTION INTRAVENOUS PRN
Status: DISCONTINUED | OUTPATIENT
Start: 2025-02-26 | End: 2025-02-27

## 2025-02-26 RX ADMIN — SODIUM CHLORIDE, PRESERVATIVE FREE 10 ML: 5 INJECTION INTRAVENOUS at 21:30

## 2025-02-26 RX ADMIN — SODIUM CHLORIDE, SODIUM LACTATE, POTASSIUM CHLORIDE, AND CALCIUM CHLORIDE: .6; .31; .03; .02 INJECTION, SOLUTION INTRAVENOUS at 22:00

## 2025-02-27 ENCOUNTER — ANESTHESIA EVENT (OUTPATIENT)
Dept: LABOR AND DELIVERY | Age: 35
End: 2025-02-27
Payer: COMMERCIAL

## 2025-02-27 ENCOUNTER — ANESTHESIA (OUTPATIENT)
Dept: LABOR AND DELIVERY | Age: 35
End: 2025-02-27
Payer: COMMERCIAL

## 2025-02-27 PROCEDURE — 2500000003 HC RX 250 WO HCPCS

## 2025-02-27 PROCEDURE — 6360000002 HC RX W HCPCS

## 2025-02-27 PROCEDURE — 6370000000 HC RX 637 (ALT 250 FOR IP)

## 2025-02-27 PROCEDURE — 51701 INSERT BLADDER CATHETER: CPT

## 2025-02-27 PROCEDURE — 1220000000 HC SEMI PRIVATE OB R&B

## 2025-02-27 PROCEDURE — 59400 OBSTETRICAL CARE: CPT | Performed by: OBSTETRICS & GYNECOLOGY

## 2025-02-27 PROCEDURE — 2580000003 HC RX 258

## 2025-02-27 PROCEDURE — 7200000001 HC VAGINAL DELIVERY

## 2025-02-27 PROCEDURE — 88307 TISSUE EXAM BY PATHOLOGIST: CPT

## 2025-02-27 PROCEDURE — 3700000025 EPIDURAL BLOCK: Performed by: ANESTHESIOLOGY

## 2025-02-27 PROCEDURE — 10907ZC DRAINAGE OF AMNIOTIC FLUID, THERAPEUTIC FROM PRODUCTS OF CONCEPTION, VIA NATURAL OR ARTIFICIAL OPENING: ICD-10-PCS | Performed by: OBSTETRICS & GYNECOLOGY

## 2025-02-27 PROCEDURE — 6360000002 HC RX W HCPCS: Performed by: NURSE ANESTHETIST, CERTIFIED REGISTERED

## 2025-02-27 PROCEDURE — 6360000002 HC RX W HCPCS: Performed by: ANESTHESIOLOGY

## 2025-02-27 RX ORDER — BISACODYL 10 MG
10 SUPPOSITORY, RECTAL RECTAL DAILY PRN
Status: DISCONTINUED | OUTPATIENT
Start: 2025-02-27 | End: 2025-03-01 | Stop reason: HOSPADM

## 2025-02-27 RX ORDER — SODIUM CHLORIDE, SODIUM LACTATE, POTASSIUM CHLORIDE, AND CALCIUM CHLORIDE .6; .31; .03; .02 G/100ML; G/100ML; G/100ML; G/100ML
500 INJECTION, SOLUTION INTRAVENOUS PRN
Status: DISCONTINUED | OUTPATIENT
Start: 2025-02-27 | End: 2025-03-01 | Stop reason: HOSPADM

## 2025-02-27 RX ORDER — PROCHLORPERAZINE EDISYLATE 5 MG/ML
10 INJECTION INTRAMUSCULAR; INTRAVENOUS ONCE
Status: DISCONTINUED | OUTPATIENT
Start: 2025-02-27 | End: 2025-02-27

## 2025-02-27 RX ORDER — PROCHLORPERAZINE EDISYLATE 5 MG/ML
10 INJECTION INTRAMUSCULAR; INTRAVENOUS ONCE
Status: COMPLETED | OUTPATIENT
Start: 2025-02-27 | End: 2025-02-27

## 2025-02-27 RX ORDER — ONDANSETRON 4 MG/1
4 TABLET, ORALLY DISINTEGRATING ORAL EVERY 6 HOURS PRN
Status: DISCONTINUED | OUTPATIENT
Start: 2025-02-27 | End: 2025-03-01 | Stop reason: HOSPADM

## 2025-02-27 RX ORDER — ACETAMINOPHEN 500 MG
1000 TABLET ORAL 4 TIMES DAILY PRN
Qty: 30 TABLET | Refills: 1 | Status: SHIPPED | OUTPATIENT
Start: 2025-02-27

## 2025-02-27 RX ORDER — IBUPROFEN 600 MG/1
600 TABLET, FILM COATED ORAL 4 TIMES DAILY PRN
Qty: 30 TABLET | Refills: 1 | Status: SHIPPED | OUTPATIENT
Start: 2025-02-27

## 2025-02-27 RX ORDER — ONDANSETRON 2 MG/ML
4 INJECTION INTRAMUSCULAR; INTRAVENOUS EVERY 6 HOURS PRN
Status: DISCONTINUED | OUTPATIENT
Start: 2025-02-27 | End: 2025-03-01 | Stop reason: HOSPADM

## 2025-02-27 RX ORDER — KETOROLAC TROMETHAMINE 30 MG/ML
30 INJECTION, SOLUTION INTRAMUSCULAR; INTRAVENOUS ONCE
Status: COMPLETED | OUTPATIENT
Start: 2025-02-27 | End: 2025-02-27

## 2025-02-27 RX ORDER — ONDANSETRON 2 MG/ML
4 INJECTION INTRAMUSCULAR; INTRAVENOUS EVERY 6 HOURS PRN
Status: DISCONTINUED | OUTPATIENT
Start: 2025-02-27 | End: 2025-02-27

## 2025-02-27 RX ORDER — NALBUPHINE HYDROCHLORIDE 10 MG/ML
5 INJECTION INTRAMUSCULAR; INTRAVENOUS; SUBCUTANEOUS EVERY 4 HOURS PRN
Status: DISCONTINUED | OUTPATIENT
Start: 2025-02-27 | End: 2025-02-27

## 2025-02-27 RX ORDER — SODIUM CHLORIDE 9 MG/ML
INJECTION, SOLUTION INTRAVENOUS PRN
Status: DISCONTINUED | OUTPATIENT
Start: 2025-02-27 | End: 2025-03-01 | Stop reason: HOSPADM

## 2025-02-27 RX ORDER — OXYCODONE HYDROCHLORIDE 5 MG/1
10 TABLET ORAL PRN
Status: DISCONTINUED | OUTPATIENT
Start: 2025-02-27 | End: 2025-03-01 | Stop reason: HOSPADM

## 2025-02-27 RX ORDER — MORPHINE SULFATE 10 MG/ML
10 INJECTION INTRAVENOUS ONCE
Status: COMPLETED | OUTPATIENT
Start: 2025-02-27 | End: 2025-02-27

## 2025-02-27 RX ORDER — SODIUM CHLORIDE 0.9 % (FLUSH) 0.9 %
5-40 SYRINGE (ML) INJECTION PRN
Status: DISCONTINUED | OUTPATIENT
Start: 2025-02-27 | End: 2025-03-01 | Stop reason: HOSPADM

## 2025-02-27 RX ORDER — LIDOCAINE HYDROCHLORIDE AND EPINEPHRINE 15; 5 MG/ML; UG/ML
INJECTION, SOLUTION EPIDURAL
Status: DISCONTINUED | OUTPATIENT
Start: 2025-02-27 | End: 2025-02-27 | Stop reason: SDUPTHER

## 2025-02-27 RX ORDER — MORPHINE SULFATE 10 MG/ML
10 INJECTION INTRAVENOUS ONCE
Status: DISCONTINUED | OUTPATIENT
Start: 2025-02-27 | End: 2025-02-27

## 2025-02-27 RX ORDER — AMOXICILLIN 250 MG
1 CAPSULE ORAL DAILY
Qty: 30 TABLET | Refills: 0 | Status: SHIPPED | OUTPATIENT
Start: 2025-02-27

## 2025-02-27 RX ORDER — NALOXONE HYDROCHLORIDE 0.4 MG/ML
INJECTION, SOLUTION INTRAMUSCULAR; INTRAVENOUS; SUBCUTANEOUS PRN
Status: DISCONTINUED | OUTPATIENT
Start: 2025-02-27 | End: 2025-02-27

## 2025-02-27 RX ORDER — EPHEDRINE SULFATE/0.9% NACL/PF 25 MG/5 ML
5 SYRINGE (ML) INTRAVENOUS PRN
Status: DISCONTINUED | OUTPATIENT
Start: 2025-02-28 | End: 2025-02-27

## 2025-02-27 RX ORDER — EPHEDRINE SULFATE/0.9% NACL/PF 25 MG/5 ML
10 SYRINGE (ML) INTRAVENOUS
Status: DISCONTINUED | OUTPATIENT
Start: 2025-02-27 | End: 2025-02-27

## 2025-02-27 RX ORDER — SIMETHICONE 80 MG
80 TABLET,CHEWABLE ORAL EVERY 6 HOURS PRN
Status: DISCONTINUED | OUTPATIENT
Start: 2025-02-27 | End: 2025-03-01 | Stop reason: HOSPADM

## 2025-02-27 RX ORDER — IBUPROFEN 600 MG/1
600 TABLET, FILM COATED ORAL EVERY 6 HOURS
Status: DISCONTINUED | OUTPATIENT
Start: 2025-02-27 | End: 2025-03-01 | Stop reason: HOSPADM

## 2025-02-27 RX ORDER — OXYCODONE HYDROCHLORIDE 5 MG/1
10 TABLET ORAL ONCE
Status: DISCONTINUED | OUTPATIENT
Start: 2025-02-27 | End: 2025-02-27

## 2025-02-27 RX ORDER — ROPIVACAINE HYDROCHLORIDE 2 MG/ML
INJECTION, SOLUTION EPIDURAL; INFILTRATION; PERINEURAL
Status: COMPLETED
Start: 2025-02-27 | End: 2025-02-27

## 2025-02-27 RX ORDER — LANOLIN
CREAM (ML) TOPICAL PRN
Status: DISCONTINUED | OUTPATIENT
Start: 2025-02-27 | End: 2025-03-01 | Stop reason: HOSPADM

## 2025-02-27 RX ORDER — ACETAMINOPHEN 500 MG
1000 TABLET ORAL EVERY 6 HOURS SCHEDULED
Status: DISCONTINUED | OUTPATIENT
Start: 2025-02-27 | End: 2025-03-01 | Stop reason: HOSPADM

## 2025-02-27 RX ORDER — SENNA AND DOCUSATE SODIUM 50; 8.6 MG/1; MG/1
1 TABLET, FILM COATED ORAL 2 TIMES DAILY
Status: DISCONTINUED | OUTPATIENT
Start: 2025-02-27 | End: 2025-03-01 | Stop reason: HOSPADM

## 2025-02-27 RX ORDER — SODIUM CHLORIDE 0.9 % (FLUSH) 0.9 %
5-40 SYRINGE (ML) INJECTION EVERY 12 HOURS SCHEDULED
Status: DISCONTINUED | OUTPATIENT
Start: 2025-02-27 | End: 2025-03-01 | Stop reason: HOSPADM

## 2025-02-27 RX ADMIN — WITCH HAZEL: 500 SOLUTION RECTAL; TOPICAL at 23:10

## 2025-02-27 RX ADMIN — Medication 166.7 ML: at 18:38

## 2025-02-27 RX ADMIN — ACETAMINOPHEN 1000 MG: 500 TABLET ORAL at 20:58

## 2025-02-27 RX ADMIN — ROPIVACAINE HYDROCHLORIDE 12 ML/HR: 2 INJECTION, SOLUTION EPIDURAL; INFILTRATION at 16:44

## 2025-02-27 RX ADMIN — ROPIVACAINE HYDROCHLORIDE 12 ML/HR: 2 INJECTION, SOLUTION EPIDURAL; INFILTRATION at 09:01

## 2025-02-27 RX ADMIN — Medication 87.3 MILLI-UNITS/MIN: at 18:50

## 2025-02-27 RX ADMIN — MORPHINE SULFATE 10 MG: 10 INJECTION INTRAVENOUS at 07:17

## 2025-02-27 RX ADMIN — MORPHINE SULFATE 10 MG: 10 INJECTION INTRAVENOUS at 03:15

## 2025-02-27 RX ADMIN — PROCHLORPERAZINE EDISYLATE 10 MG: 5 INJECTION INTRAMUSCULAR; INTRAVENOUS at 03:15

## 2025-02-27 RX ADMIN — Medication 1 MILLI-UNITS/MIN: at 15:21

## 2025-02-27 RX ADMIN — SODIUM CHLORIDE, SODIUM LACTATE, POTASSIUM CHLORIDE, AND CALCIUM CHLORIDE: .6; .31; .03; .02 INJECTION, SOLUTION INTRAVENOUS at 09:02

## 2025-02-27 RX ADMIN — SODIUM CHLORIDE, SODIUM LACTATE, POTASSIUM CHLORIDE, AND CALCIUM CHLORIDE: .6; .31; .03; .02 INJECTION, SOLUTION INTRAVENOUS at 06:10

## 2025-02-27 RX ADMIN — LIDOCAINE HYDROCHLORIDE,EPINEPHRINE BITARTRATE 5 ML: 15; .005 INJECTION, SOLUTION EPIDURAL; INFILTRATION; INTRACAUDAL; PERINEURAL at 08:51

## 2025-02-27 RX ADMIN — SODIUM CHLORIDE, SODIUM LACTATE, POTASSIUM CHLORIDE, AND CALCIUM CHLORIDE: .6; .31; .03; .02 INJECTION, SOLUTION INTRAVENOUS at 15:05

## 2025-02-27 RX ADMIN — KETOROLAC TROMETHAMINE 30 MG: 30 INJECTION, SOLUTION INTRAMUSCULAR at 20:17

## 2025-02-27 RX ADMIN — PROCHLORPERAZINE EDISYLATE 10 MG: 5 INJECTION INTRAMUSCULAR; INTRAVENOUS at 07:17

## 2025-02-27 RX ADMIN — SODIUM CHLORIDE, PRESERVATIVE FREE 10 ML: 5 INJECTION INTRAVENOUS at 23:11

## 2025-02-27 RX ADMIN — BENZOCAINE AND LEVOMENTHOL: 200; 5 SPRAY TOPICAL at 23:11

## 2025-02-27 ASSESSMENT — PAIN DESCRIPTION - LOCATION
LOCATION: ABDOMEN
LOCATION: ABDOMEN

## 2025-02-27 ASSESSMENT — PAIN SCALES - GENERAL
PAINLEVEL_OUTOF10: 3
PAINLEVEL_OUTOF10: 10
PAINLEVEL_OUTOF10: 3
PAINLEVEL_OUTOF10: 10

## 2025-02-27 NOTE — DISCHARGE SUMMARY
Obstetric Discharge Summary  [facility]    Patient Name: Kaleigh Boudreaux  Patient : 1990  Primary Care Physician: Orquidea Fowler PA-C  Admit Date: 2025    Principal Diagnosis: IUP at 39w5d, admitted for Spontaneous labor with SROM     Her pregnancy has been complicated by:   Patient Active Problem List   Diagnosis    History of chicken pox    Fetal VSD    cHTN (no meds) w/ YOANNA w/o SF     25 M Apg 8/9 Wt 7#6    Anemia    Right labial hematoma       Infection Present?: No  Hospital Acquired: N/A    Surgical Operations & Procedures:  Analgesia: epidural  Delivery Type: Spontaneous Vaginal Delivery: See Labor and Delivery Summary   Laceration(s): Right vaginal repaired with 3-0 Vicryl    Consultations: NICU, and Anesthesia    Pertinent Findings & Procedures:   Kaleigh Boudreaux is a 34 y.o. female  at 39w5d admitted for Spontaneous labor with SROM; received morphine/compazine x2, amniotomy of forebag, epidural, IUPC, Amnioinfusion, Pitocin.     PreE labs wnl, P/C 0.39. Patient met criteria for cHTN with YOANNA w/o SF.     She delivered by spontaneous vaginal a Live Born infant on  25.       Information for the patient's :  Monica Boudreaux [[id number]]   female   Birth Weight: 3.37 kg (7 lb 6.9 oz)    Apgars: 8 at 1 minute and 9 at 5 minutes.     Postpartum course:   PPD#0: Pt noted to have a stable right labial hematoma measuring 4x2 cm.   PPD#1: Labial hematoma remained stable, pain controlled.  Hgb 8.1 but patient asymptomatic. Rx for PO iron sent  PPD#2: Meeting discharge requirements    Course of patient: complicated by YOANNA w/o SF, right labial hematoma.     Discharge to: Home    Readmission planned: no     Indication for 6 week PP 2 hour GTT?: no     Eligible for 2 week PP virtual visit? no - private patient    Recommendations on Discharge:     Medications:      Medication List        START taking these medications      acetaminophen

## 2025-02-27 NOTE — CARE COORDINATION
ANTEPARTUM NOTE    39 weeks gestation of pregnancy [Z3A.39]    Kaleigh was admitted to L&D on 2/26/25 for LOF @ 39w5d    OB GYN Provider: Dr. Robbins    Will meet with patient after delivery to verify name/address/phone/insurance and discuss discharge planning.     Anticipate DC home 2 nights after vaginal delivery or 4 nights after C/S delivery as long as hemodynamically stable.

## 2025-02-27 NOTE — PROGRESS NOTES
Labor Progress Note    Kaleigh Boudreaux is a 34 y.o. female  at 39w6d  The patient was seen and examined. Her pain is well controlled. She reports fetal movement is present, complains of contractions, complains of loss of fluid, denies vaginal bleeding.       Vital Signs:  Vitals:    25 1000 25 1030 25 1100 25 1130   BP: 116/73 (!) 108/53 109/60 121/82   Pulse: (!) 102 88  (!) 107   Resp: 18      Temp: 98.8 °F (37.1 °C)      TempSrc: Oral      SpO2: 95% 97% 98% 97%        FHT: 130, moderate variability, accelerations present, decelerations absent  Contractions: regular, every 3-4 minutes    Chaperone for Intimate Exam: Chaperone was present for entire exam, Chaperone Name: BECCA Gomez  Cervical Exam:   Pitocin: @ 0 mu/min    Membranes: Ruptured meconium stained  Scalp Electrode in place: absent  Intrauterine Pressure Catheter in Place: present    Interventions: SVE, IUPC/amnioinfusion    Assessment/Plan:  Kaleigh Boudreaux is a 34 y.o. female  at 39w6d admitted for PROM ([facility]) (1830)  > Spont labor    - GBS negative, No indication for GBS prophylaxis   - VSS, afebrile   - cEFM/TOCO: CAT 1   - SVE:    - Patient is comfortable with contractions after epidural   - IUPC placed and amnioinfusion started   - Will continue to monitor closely      Attending updated and in agreement with plan    Gabby Marie DO  Ob/Gyn Resident  2025, 12:06 PM

## 2025-02-27 NOTE — H&P
OBSTETRICAL HISTORY AND PHYSICAL  [facility]    Date: 2025       Time: 9:36 PM   Patient Name: Kaleigh Boudreaux     Patient : 1990  Room/Bed: 0706/0706-01    Admission Date/Time: 2025  7:53 PM      CC: Leakage of fluid      HPI: Kaleigh Boudreaux is a 34 y.o.  at 39w5d who presents with complaints of leakage of fluid since 6:30pm. The patient reports fetal movement is present, complains of contractions, complains of loss of fluid, denies vaginal bleeding.      Patient denies any headache, visual changes, difficulty breathing, RUQ pain, N/V, F/C, and pain/swelling in lower extremities. Denies any dysuria or vaginal discharge.     DATING:  LMP: Patient's last menstrual period was 2024.  Estimated Date of Delivery: 25   Based on: LMP, early ultrasound, at 12 1/7 weeks GA    PREGNANCY RISK FACTORS:  Patient Active Problem List   Diagnosis    History of chicken pox    Fetal VSD    cHTN (no meds)    38 weeks gestation of pregnancy    Rh+/RI/GBSneg       REVIEW OF SYSTEMS:   Constitutional: negative fever, negative chills  HEENT: negative visual disturbances, negative headaches, negative dizziness  Breast: negative breast abnormalities, negative breast lumps, negative nipple discharge  Respiratory: negative dyspnea, negative cough, negative SOB  Cardiovascular: negative chest pain,  negative palpitations, negative arrhythmia, negative syncope   Gastrointestinal: positive abdominal pain secondary to contractions, negative RUQ pain, negative N/V/D, negative constipation, negative bowel changes, negative heartburn   Genitourinary: negative pelvic pain, negative dysuria, negative hematuria, negative urinary incontinence, negative vaginal discharge  Dermatological: negative rash, negative pruritis  Hematologic: negative bruising  Immunologic/Lymphatic: negative recent illness, negative recent sick contact  Musculoskeletal: negative back pain, negative myalgias,  risk of chorioamnionitis.    - Patient declining Pitocin at this time and would like to see if contractions would cause cervical change                 - Admit to L&D under service of Dr. Bustso              - CBC, T&S, T.pal, CMP, P/C, UDS pending     - Continue to monitor               cHTN (no meds)    - BPs elevated non severe on admission    - Denies s/sx of PreE   - PreE labs, P/C ordered   - Continue to monitor        Fetal VSD   - Noted on [facility] anatomy US  - Also noted on fetal ECHO        BMI 25       Patient Active Problem List    Diagnosis Date Noted    Rh+/RI/GBSneg 2025    38 weeks gestation of pregnancy 2025    Fetal VSD 10/15/2024     Seen on [facility] US 10/15      cHTN (no meds) 10/15/2024     Based upon chart review from blood pressures in Care Everywhere tab in EPIC, patient is diagnosed with chronic hypertension at [facility] 10/15/24      History of chicken pox 2017       Plan discussed with on-call physician Dr. Bustos, who is agreeable.     Risks, benefits, alternatives and possible complications have been discussed in detail with the patient. Admission, and post admission procedures and expectations were discussed in detail. All questions were answered.    Patient's primary ob/gyn provider: Dr. Zamudio     Steroids Given In This Pregnancy: no  Steroids given this admission: keo Medina MD  Ob/Gyn Resident  2025, 9:36 PM

## 2025-02-27 NOTE — ANESTHESIA PROCEDURE NOTES
Epidural Block    Patient location during procedure: OB  Start time: 2/27/2025 8:51 AM  Reason for block: labor epidural  Staffing  Performed: resident/CRNA   Resident/CRNA: Danny Kinsey APRN - CRNA  Performed by: Danny Kinsey APRN - CRNA  Authorized by: Julisa Hernandez MD    Epidural  Patient position: sitting  Prep: Betadine  Patient monitoring: continuous pulse ox and frequent blood pressure checks  Approach: midline  Location: L3-4  Injection technique: VOLODYMYR air  Guidance: paresthesia technique  Provider prep: mask and sterile gloves  Needle  Needle type: Tuohy   Needle gauge: 17 G  Needle length: 3.5 in  Needle insertion depth: 6 cm  Catheter type: side hole  Catheter size: 20 G  Catheter at skin depth: 12 cm  Test dose: negativeCatheter Secured: tegaderm and tape  Assessment  Sensory level: T6  Hemodynamics: stable  Attempts: 1  Outcomes: uncomplicated and patient tolerated procedure well  Preanesthetic Checklist  Completed: patient identified, IV checked, site marked, risks and benefits discussed, surgical/procedural consents, equipment checked, pre-op evaluation, timeout performed, anesthesia consent given, oxygen available and monitors applied/VS acknowledged

## 2025-02-27 NOTE — FLOWSHEET NOTE
0845 Cornelius Kinsey CRNA, at bedside.  Epidural procedure explained, risks discussed.  Pt verbalizes consent for epidural.   0847 patient positioned for epidural. 0848 Time out completed.   0851 catheter placed. 0852 test dose given.  Epidural catheter taped and secured per anesthesia.   0857 to low fowlers with left uterine displacement. 0859 loading dose given. 0859 pump initiated.  Pt tolerated procedure well.

## 2025-02-27 NOTE — ANESTHESIA PRE PROCEDURE
Department of Anesthesiology  Preprocedure Note       Name:  Kaleigh Boudreaux   Age:  34 y.o.  :  1990                                          MRN:  [id number]         Date:  2025      Surgeon: * No surgeons listed *    Procedure: * No procedures listed *    Medications prior to admission:   Prior to Admission medications    Medication Sig Start Date End Date Taking? Authorizing Provider   Prenatal Vit-DSS-Fe Cbn-FA (PRENATAL ADVANTAGE PO) Take by mouth   Yes ProviderAntony MD   magnesium gluconate (MAGONATE) 500 MG tablet Take 1 tablet by mouth 2 times daily   Yes Provider, MD Antony   Omega-3 Fatty Acids (FISH OIL) 1000 MG CPDR Take 3 capsules by mouth   Yes ProviderAntony MD   omeprazole (PRILOSEC) 20 MG delayed release capsule Take 1 capsule by mouth daily  Patient not taking: Reported on 2024 10/15/24   Malathi Zamudio DO       Current medications:    Current Facility-Administered Medications   Medication Dose Route Frequency Provider Last Rate Last Admin    ROPivacaine (NAROPIN) 0.2% injection 0.2%             ROPivacaine 0.2% in sodium chloride 0.9% (OB)  10 mL/hr Epidural Continuous Julisa Hernandez MD        naloxone 0.4 mg in 10 mL sodium chloride syringe   IntraVENous PRN Julisa Hernandez MD        ePHEDrine injection 10 mg  10 mg IntraVENous Once PRN Julisa Hernandez MD        Followed by    [START ON 2025] ePHEDrine injection 5 mg  5 mg IntraVENous PRN Julisa Hernandez MD        nalbuphine (NUBAIN) injection 5 mg  5 mg IntraVENous Q4H PRN Julisa Hernandez MD        ondansetron (ZOFRAN) injection 4 mg  4 mg IntraVENous Q6H PRN Julisa Hernandez MD        lactated ringers infusion   IntraVENous Continuous Edith Medina  mL/hr at 25 0821 Rate Verify at 25 0821    sodium chloride flush 0.9 % injection 5-40 mL  5-40 mL IntraVENous 2 times per day Edith Medina MD   10 mL at 25 2130    0.9 % sodium chloride infusion    IntraVENous PRN Edith Medina MD        diphenhydrAMINE (BENADRYL) tablet 25 mg  25 mg Oral Q4H PRN Edith Medina MD        Or    diphenhydrAMINE (BENADRYL) injection 25 mg  25 mg IntraVENous Q4H PRN Edith Medina MD        acetaminophen (TYLENOL) tablet 1,000 mg  1,000 mg Oral Q6H PRN Edith Medina MD        witch hazel-glycerin (TUCKS) pad   Topical PRN Edith Medina MD        simethicone (MYLICON) chewable tablet 80 mg  80 mg Oral Q6H PRN Edith Medina MD        magnesium hydroxide (MILK OF MAGNESIA) 400 MG/5ML suspension 30 mL  30 mL Oral Daily PRN Edith Medina MD           Allergies:    Allergies   Allergen Reactions    Bactrim Hives       Problem List:    Patient Active Problem List   Diagnosis Code    History of chicken pox Z86.19    Fetal VSD O35.BXX0    cHTN (no meds) O10.919    38 weeks gestation of pregnancy Z3A.38    Rh+/RI/GBSneg Z3A.39       Past Medical History:        Diagnosis Date    Depression     Seasonal allergies        Past Surgical History:        Procedure Laterality Date    TONSILLECTOMY      WISDOM TOOTH EXTRACTION         Social History:    Social History     Tobacco Use    Smoking status: Never    Smokeless tobacco: Never   Substance Use Topics    Alcohol use: Not Currently     Comment: rare                                Counseling given: Not Answered      Vital Signs (Current):   Vitals:    02/26/25 2005 02/27/25 0110 02/27/25 0615 02/27/25 0803   BP: (!) 138/95 139/79 135/88 (!) 147/59   Pulse: 95 85 (!) 101 (!) 115   Resp: 18  17 20   Temp: 36.5 °C (97.7 °F) 36.8 °C (98.3 °F) 36.7 °C (98 °F) 36.4 °C (97.6 °F)   TempSrc: Oral  Oral Oral   SpO2: 98%                                                 BP Readings from Last 3 Encounters:   02/27/25 (!) 147/59   02/19/25 134/85   02/11/25 122/64       NPO Status:                                                                                 BMI:   Wt Readings from Last 3 Encounters:   02/11/25 67.8

## 2025-02-27 NOTE — FLOWSHEET NOTE
RN at bedside. Patient states she is more uncomfortable. Repositioned patient to hands and knees.  Pain interventions explained and offered but patient wants to continue slow, deep breathing for now.  Educated and demonstrated to FOB on how to apply counter pressure during contractions. Patient content with plan for now with the hope of having a natural delivery. She plans to defer pitocin as she's made cervical change.

## 2025-02-27 NOTE — PROGRESS NOTES
Labor Progress Note    Kaleigh Boudreaux is a 34 y.o. female  at 39w6d  The patient was seen and examined. SVE at bedside and patient tolerated well. Amniotomy of forebag. Her pain is well controlled. She reports fetal movement is present, complains of contractions, complains of loss of fluid, denies vaginal bleeding.       Vital Signs:  /88   Pulse (!) 101   Temp 98 °F (36.7 °C) (Oral)   Resp 17   LMP 2024   SpO2 98%      FHT:  115 , minimal variability, accelerations present, decelerations absent  Contractions: regular, every 3 minutes    Chaperone for Intimate Exam: Chaperone was present for entire exam, Chaperone Name: BECCA Pichardo   Cervical Exam: 4 cm dilated, 90 effaced, -1 station  Pitocin: @ 0 mu/min    Membranes: Ruptured meconium stained  Scalp Electrode in place: absent  Intrauterine Pressure Catheter in Place: absent    Interventions: SVE, amniotomy of forebag     Assessment/Plan:  Kaleigh Boudreaux is a 34 y.o. female  at 39w6d admitted for PROM > Spontaneous Labor    - GBS negative, No indication for GBS prophylaxis   - VSS   - cEFM/TOCO: cat 1 FHT    - Morphine/compazine x1    - Additional morphine/compazine ordered now    - Continue expectant management     cHTN (no meds) w/ YOANNA w/o SF    - Normotensive at this time    - Denies s/s of PreE    - PreE labs wnl, P/C 0.39    - Monitor closely     Attending updated and in agreement with plan    Fredo Johnson MD  Ob/Gyn Resident  2025, 6:50 AM

## 2025-02-27 NOTE — PLAN OF CARE
Problem: Vaginal Birth or  Section  Goal: Fetal and maternal status remain reassuring during the birth process  Description:  Birth OB-Pregnancy care plan goal which identifies if the fetal and maternal status remain reassuring during the birth process  Outcome: Progressing     Problem: Pain  Goal: Verbalizes/displays adequate comfort level or baseline comfort level  Outcome: Progressing     Problem: Safety - Adult  Goal: Free from fall injury  Outcome: Progressing     Problem: Chronic Conditions and Co-morbidities  Goal: Patient's chronic conditions and co-morbidity symptoms are monitored and maintained or improved  Outcome: Progressing

## 2025-02-27 NOTE — PROGRESS NOTES
Labor Progress Note    Kaleigh Boudreaux is a 34 y.o. female  at 39w6d  The patient was seen and examined. Her pain is well controlled. She reports fetal movement is present, complains of contractions, complains of loss of fluid, denies vaginal bleeding.       Vital Signs:  Vitals:    25 1230 25 1300 25 1400 25 1430   BP: (!) 105/50 109/85 106/71 136/84   Pulse: 90 (!) 105 (!) 122 (!) 115   Resp:       Temp:    99.4 °F (37.4 °C)   TempSrc:    Oral   SpO2: 99% 97% 98% 99%         FHT: 125, moderate variability, accelerations present, decelerations absent  Contractions: irregular, tracing with IUPC    Chaperone for Intimate Exam: Chaperone was present for entire exam, Chaperone Name: BECCA Gomez  Cervical Exam: 4 cm dilated, 70 effaced, -1 station    Membranes: SROM at 1830 on 25, AROM of forebag with meconium at 0635  Scalp Electrode in place: absent  Intrauterine Pressure Catheter in Place: IUPC in place    Interventions: SVE    Assessment/Plan:  Kaleigh Boudreaux is a 34 y.o. female  at 39w6d admitted for Spontaneous labor with SROM   - GBS negative, No indication for GBS prophylaxis   - VSS, afebrile   -SVE: /-1   -cEFM/TOCO: category 1   -Meconium stained fluid> IUPC/Amnioinfusion in place   -Epidural in place   -Discussed given prolonged rupture with inadequate contractions, would suggest initiating Pitocin per protocol. Patient agreeable.   - Continue to monitor    Attending updated and in agreement with plan    Theresa Madrid DO  Ob/Gyn Resident  2025, 3:00 PM

## 2025-02-27 NOTE — PROGRESS NOTES
Labor Progress Note    Kaleigh Boudreaux is a 34 y.o. female  at 39w6d  The patient was seen and examined. Her pain is not well controlled. She reports fetal movement is present, complains of contractions, complains of loss of fluid, denies vaginal bleeding.       Vital Signs:  Vitals:    25 2005 25 0110 25 0615   BP: (!) 138/95 139/79 135/88   Pulse: 95 85 (!) 101   Resp: 18  17   Temp: 97.7 °F (36.5 °C) 98.3 °F (36.8 °C) 98 °F (36.7 °C)   TempSrc: Oral  Oral   SpO2: 98%         FHT:  125 , moderate variability, accelerations present, decelerations absent  Contractions: regular, every 3 minutes    Chaperone for Intimate Exam: Chaperone was present for entire exam, Chaperone Name: Kylee HUDSON   Cervical Exam: 2 cm dilated, 90 effaced, -1 station  Pitocin: @ 0 mu/min    Membranes: Ruptured meconium stained  Scalp Electrode in place: absent  Intrauterine Pressure Catheter in Place: absent    Interventions: SVE    Assessment/Plan:  Kaleigh Boudreaux is a 34 y.o. female  at 39w6d admitted for PROM ([facility]) (1830)  > Spont labor    - GBS negative, No indication for GBS prophylaxis   - VSS, afebrile    - Cat 1 FHT and TOCO showing regular contractions    - Membranes Ruptured meconium stained   - SVE 2/90%/-1   - cEFM and TOCO   - Continue expectant management     Attending updated and in agreement with plan    Edith Medina MD  Ob/Gyn Resident  2025, 3:04 AM

## 2025-02-27 NOTE — PROGRESS NOTES
Labor Progress Note    Kaleigh Boudreaux is a 34 y.o. female  at 39w6d  The patient was seen and examined. Her pain is well controlled. She reports fetal movement is present, complains of contractions, complains of loss of fluid, denies vaginal bleeding.       Vital Signs:  Vitals:    25 1500 25 1530 25 1600 25 1630   BP: (!) 115/58 121/70 121/65 118/66   Pulse: 96 97 96 (!) 101   Resp:    16   Temp:    99.4 °F (37.4 °C)   TempSrc:    Oral   SpO2: 100%  97% 97%       FHT:  145 , moderate variability, accelerations present, decelerations absent  Contractions: regular, every 2 minutes    Chaperone for Intimate Exam: Chaperone was present for entire exam, Chaperone Name: L&D, RN  Cervical Exam: 10 cm dilated, 100 effaced, +1 station  Pitocin: @ 2 mu/min    Membranes: Ruptured clear fluid  Scalp Electrode in place: absent  Intrauterine Pressure Catheter in Place: absent    Interventions: SVE    Assessment/Plan:  Kaleigh Boudreaux is a 34 y.o. female  at 39w6d admitted for Spont Labor   - GBS negative, No indication for GBS prophylaxis   - VSS, afebrile   - CEFM/TOCO showing cat 1 tracing   - SVE 10/100%/+1   - Epidural in place   - S/p Amniotomy of forebag   - IUPC/Amnio in place   - S/p Morphine/compazine x2   - Continue pit per protocol   - Attending notified   - Continue to monitor    Attending updated and in agreement with plan    Daniel Torres MD  Ob/Gyn Resident  2025, 4:50 PM

## 2025-02-27 NOTE — PROGRESS NOTES
Obstetric/Gynecology Resident Interval Note    Patient had a R/B/A discussion with Dr. Robbins about being ruptured since 1830 on 2/26/25 and starting Ancef given her prolonged ruptured status.     It was explained to the patient that with prolonged rupture of membranes, the risk of chorioamnionitis increases, which can cause both maternal and fetal morbidity, as well as automatic NICU admission for the baby. At this time, the patient is declining Ancef.     Dr. Robbins updated.     FHT: Baseline 125 bpm, moderate variability, accelerations present, decelerations absent, irregular contractions.     Theresa Madrid DO  OB/GYN Resident, PGY2  [place], [place]  2/27/2025, 1:29 PM

## 2025-02-27 NOTE — FLOWSHEET NOTE
Patient presents to L&D with c/o SROM at 1830 tonight. Patient states she is feeling occasional contractions and denies vaginal bleeding. +FM.

## 2025-02-27 NOTE — PROGRESS NOTES
Labor Progress Note    Kaleigh Boudreaux is a 34 y.o. female  at 39w6d  The patient was seen and examined. Her pain is not well controlled. She is declining pain medications at this time. She reports fetal movement is present, complains of contractions, complains of loss of fluid, denies vaginal bleeding.       Vital Signs:  Vitals:    25   BP: (!) 138/95   Pulse: 95   Resp: 18   Temp: 97.7 °F (36.5 °C)   TempSrc: Oral   SpO2: 98%       FHT: 130, moderate variability, accelerations present, decelerations absent  Contractions: regular, every 3-5 minutes    Chaperone for Intimate Exam: Chaperone was present for entire exam, Chaperone Name: Kylee HUDSON   Cervical Exam: 2 cm dilated, 80 effaced, -1 station  Pitocin: @ 0 mu/min    Membranes: Ruptured meconium stained  Scalp Electrode in place: absent  Intrauterine Pressure Catheter in Place: absent    Interventions: SVE     Assessment/Plan:  Kaleigh Boudreaux is a 34 y.o. female  at 39w6d admitted for PROM > Spont  labor with SROM    - GBS negative, No indication for GBS prophylaxis   - VSS, afebrile    - Cat 1 FHT and TOCO showing regular contractions    - Membranes ruptured meconium stained   - SVE 2/80%/-1   - cEFM and TOCO   - Patient made change to 2/80/-1 and is now considered to be in spontaneous labor with spontaneous rupture of membranes.  Offered augmentation with Pitocin to prevent prolonged rupture of membrane and prolonged rupture given the risks of infections like chorioamnionitis, and hemorrhage.  However, patient still declining augmentation at this time and would like to continue with expectant management.    cHTN w/ YOANNA    - BPs elevated nonsevere on arrival   - Controlled without medication at this time   - Denies s/sx of PreE   - PreE labs wnl, P/C 0.39    - Continue to monitor    Attending updated and in agreement with plan    Edith Medina MD  OB/GYN Resident, PGY1  [facility],  [place]  2/27/2025, 1:19 AM

## 2025-02-27 NOTE — FLOWSHEET NOTE
RN at bedside, patient very uncomfortable and not coping well with contractions. Patient requesting an epidural at this time. Residents notified.

## 2025-02-28 PROBLEM — D64.9 ANEMIA: Status: ACTIVE | Noted: 2025-02-28

## 2025-02-28 PROBLEM — Z3A.39 39 WEEKS GESTATION OF PREGNANCY: Status: RESOLVED | Noted: 2025-02-26 | Resolved: 2025-02-28

## 2025-02-28 PROBLEM — Z3A.38 38 WEEKS GESTATION OF PREGNANCY: Status: RESOLVED | Noted: 2025-02-19 | Resolved: 2025-02-28

## 2025-02-28 PROBLEM — N90.89 HEMATOMA OF LABIA MAJORA: Status: ACTIVE | Noted: 2025-02-28

## 2025-02-28 LAB
HCT VFR BLD AUTO: 25.1 % (ref 36.3–47.1)
HGB BLD-MCNC: 8.1 G/DL (ref 11.9–15.1)

## 2025-02-28 PROCEDURE — 85018 HEMOGLOBIN: CPT

## 2025-02-28 PROCEDURE — 2500000003 HC RX 250 WO HCPCS

## 2025-02-28 PROCEDURE — 1220000000 HC SEMI PRIVATE OB R&B

## 2025-02-28 PROCEDURE — 85014 HEMATOCRIT: CPT

## 2025-02-28 PROCEDURE — 99024 POSTOP FOLLOW-UP VISIT: CPT | Performed by: STUDENT IN AN ORGANIZED HEALTH CARE EDUCATION/TRAINING PROGRAM

## 2025-02-28 PROCEDURE — 36415 COLL VENOUS BLD VENIPUNCTURE: CPT

## 2025-02-28 PROCEDURE — 6370000000 HC RX 637 (ALT 250 FOR IP)

## 2025-02-28 RX ORDER — FERROUS SULFATE 325(65) MG
325 TABLET ORAL EVERY OTHER DAY
Qty: 180 TABLET | Refills: 0 | Status: SHIPPED | OUTPATIENT
Start: 2025-02-28

## 2025-02-28 RX ADMIN — SODIUM CHLORIDE, PRESERVATIVE FREE 10 ML: 5 INJECTION INTRAVENOUS at 11:13

## 2025-02-28 RX ADMIN — ACETAMINOPHEN 1000 MG: 500 TABLET ORAL at 04:25

## 2025-02-28 RX ADMIN — SENNOSIDES AND DOCUSATE SODIUM 1 TABLET: 50; 8.6 TABLET ORAL at 22:19

## 2025-02-28 RX ADMIN — ACETAMINOPHEN 1000 MG: 500 TABLET ORAL at 22:19

## 2025-02-28 RX ADMIN — IBUPROFEN 600 MG: 600 TABLET ORAL at 12:16

## 2025-02-28 RX ADMIN — IBUPROFEN 600 MG: 600 TABLET ORAL at 04:25

## 2025-02-28 RX ADMIN — IBUPROFEN 600 MG: 600 TABLET ORAL at 22:19

## 2025-02-28 ASSESSMENT — PAIN DESCRIPTION - LOCATION
LOCATION: PERINEUM
LOCATION: PERINEUM
LOCATION: ABDOMEN

## 2025-02-28 ASSESSMENT — PAIN SCALES - GENERAL
PAINLEVEL_OUTOF10: 2
PAINLEVEL_OUTOF10: 1
PAINLEVEL_OUTOF10: 3

## 2025-02-28 ASSESSMENT — PAIN DESCRIPTION - DESCRIPTORS
DESCRIPTORS: CRAMPING
DESCRIPTORS: SORE
DESCRIPTORS: SORE;DISCOMFORT

## 2025-02-28 ASSESSMENT — PAIN DESCRIPTION - ORIENTATION: ORIENTATION: MID;LOWER

## 2025-02-28 ASSESSMENT — PAIN - FUNCTIONAL ASSESSMENT: PAIN_FUNCTIONAL_ASSESSMENT: ACTIVITIES ARE NOT PREVENTED

## 2025-02-28 NOTE — PROGRESS NOTES
Obstetric/Gynecology Resident Interval Note    Writer to bedside secondary to report of labial swelling.  The patient is only a few hours postpartum after an uncomplicated vaginal delivery.  Upon arrival, the patient is resting comfortably in bed, no acute distress.  She states that she has been having vulvar pain, however it is significantly improved, rates pain 2/10.  External exam performed at bedside with chaperone (RN).  Exam reveals right labial hematoma, approximately 4 x 2 cm, without evidence of rapid expansion.    Will continue to monitor closely, and monitor for rapidly expanding hematoma.  Roxicodone 10 mg x 1 as needed for pain if the pain begins to worsen.  Start perennial ice pack at this time.    Fredo Johnson MD  OB/GYN Resident, PGY3  [place], [place]  2/27/2025, 9:53 PM

## 2025-02-28 NOTE — PROGRESS NOTES
POST PARTUM DAY # 1    Kaleigh Boudreaux is a 34 y.o. female  This patient was seen & examined today.  on 25    Her pregnancy was complicated by:   Patient Active Problem List   Diagnosis    History of chicken pox    Fetal VSD    cHTN (no meds)    38 weeks gestation of pregnancy    Rh+/RI/GBSneg     25 M Apg 8/9 Wt **       Today she is doing well without any chief complaint. Her lochia is light. She denies chest pain, shortness of breath, headache, lightheadedness and blurred vision. She is breast feeding and she denies any breast tenderness. She is ambulating well. Her voiding pattern is normal. I reviewed signs and symptoms of post partum depression with the patient, she currently denies any of these symptoms. She is tolerating solids.     Last night pt was diagnosed with right labial hematoma measuring 4 x 2 cm.  At this time the patient's pain is controlled with Tylenol Motrin.  Hematoma remains the same, without expansion.  No concern for rapidly expanding hematoma at this time.  Will continue to monitor closely    Vital Signs:  /76   Pulse (!) 102   Temp 98.2 °F (36.8 °C) (Oral)   Resp 18   LMP 2024   SpO2 98%   Breastfeeding Unknown      Physical Exam:  General:  no apparent distress, alert and cooperative  Neurologic:  alert, oriented, normal speech, no focal findings or movement disorder noted  Lungs:  No increased work of breathing  Heart: Regular rate  Abdomen: abdomen soft, non-distended, non-tender  Fundus: non-tender, firm, below umbilicus  Perineum: 4 x 2 cm right labial hematoma, unchanged from previous exam.  Mild tenderness to palpation.  Stable hematoma.  No other abnormality.  No vaginal bleeding noted on external exam.  Extremities:  no calf tenderness, non edematous    Lab:  Lab Results   Component Value Date    HGB 12.6 2025     Lab Results   Component Value Date    HCT 37.9 2025       Assessment/Plan:  Kaleigh Boudreaux is a  PPD #  1 s/p    - Doing well, VSS   - female infant in General Care Nursery   - Encourage ambulation   - Postpartum Hgb/Hct awaiting   - Motrin/tylenol  Rh positive/Rubella immune  Breast feeding  cHTN (no meds) w/ YOANNA w/o SF     - Normotensive    - Denies s/s of PreE    - PreE labs wnl, P/C 0.39   - Monitor closely   Right Labial Hematoma    - 4X2, non-expanding, stable    - Pain controlled    - H&H pending this AM   Continue post partum care    Counseling Completed:  Secondary Smoke risks and Sudden Infant Death Syndrome were reviewed with recommendations. Infant sleeping, \"back to sleep\" and avoidance of co-sleeping recommendations were reviewed.  Signs and Symptoms of Post Partum Depression were reviewed. The patient is to call if any occur.  Signs and symptoms of Mastitis were reviewed. The patient is to call if any occur for follow up.  Discharge instructions including pelvic rest, no driving with pain medicine and office follow-up were reviewed with patient     Attending Physician: Dr. Lizz Johnson MD  Ob/Gyn Resident   2025, 3:06 AM          Attending Physician Statement  I have discussed the care of Kaleigh Boudreaux, including pertinent history and exam findings,  with the resident. I have seen and examined the patient and the key elements of all parts of the encounter have been performed by me.  I agree with the assessment, plan and orders as documented by the resident.  (GC Modifier)    Malathi Zamudio DO     Does not report severe pain or difficulty voiding with the labial hematoma.

## 2025-02-28 NOTE — PLAN OF CARE
Problem: Vaginal Birth or  Section  Goal: Fetal and maternal status remain reassuring during the birth process  Description:  Birth OB-Pregnancy care plan goal which identifies if the fetal and maternal status remain reassuring during the birth process  2025 by Patricia Abraham RN  Outcome: Completed     Problem: Pain  Goal: Verbalizes/displays adequate comfort level or baseline comfort level  2025 by Patricia Abraham RN  Outcome: Progressing     Problem: Safety - Adult  Goal: Free from fall injury  2025 by Patricia Abraham RN  Outcome: Progressing     Problem: Chronic Conditions and Co-morbidities  Goal: Patient's chronic conditions and co-morbidity symptoms are monitored and maintained or improved  2025 by Patricia Abraham, RN  Outcome: Progressing

## 2025-02-28 NOTE — ANESTHESIA POSTPROCEDURE EVALUATION
Department of Anesthesiology  Postprocedure Note    Patient: Kaleigh Boudreaux  MRN: [id number]  YOB: 1990  Date of evaluation: 2/27/2025    Procedure Summary       Date: 02/27/25 Room / Location:     Anesthesia Start: 0845 Anesthesia Stop: 1832    Procedure: Labor Analgesia Diagnosis:     Scheduled Providers:  Responsible Provider: Julisa Hernandez MD    Anesthesia Type: epidural ASA Status: 2            Anesthesia Type: No value filed.    Sayra Phase I:      Sayra Phase II:      Anesthesia Post Evaluation    Patient location during evaluation: floor  Patient participation: complete - patient participated  Level of consciousness: awake and alert  Pain score: 0  Airway patency: patent  Nausea & Vomiting: no nausea and no vomiting  Cardiovascular status: hemodynamically stable  Respiratory status: acceptable  Hydration status: euvolemic  Pain management: adequate        No notable events documented.  
No

## 2025-02-28 NOTE — PLAN OF CARE
Problem: Pain  Goal: Verbalizes/displays adequate comfort level or baseline comfort level  2025 by Emily Pichardo RN  Outcome: Progressing  2025 0502 by Shadi Rose RN  Outcome: Progressing     Problem: Safety - Adult  Goal: Free from fall injury  2025 by Emily Pichardo RN  Outcome: Progressing  2025 0502 by Shadi Rose RN  Outcome: Progressing     Problem: Chronic Conditions and Co-morbidities  Goal: Patient's chronic conditions and co-morbidity symptoms are monitored and maintained or improved  2025 by Emily Pichardo RN  Outcome: Progressing  2025 0502 by Shadi Rose RN  Outcome: Progressing     Problem: Postpartum  Goal: Experiences normal postpartum course  Description:  Postpartum OB-Pregnancy care plan goal which identifies if the mother is experiencing a normal postpartum course  2025 by Emily Pichardo RN  Outcome: Progressing  2025 0502 by Shadi Rose RN  Outcome: Progressing  Goal: Appropriate maternal -  bonding  Description:  Postpartum OB-Pregnancy care plan goal which identifies if the mother and  are bonding appropriately  2025 by Emily Pichardo RN  Outcome: Progressing  2025 0502 by Shadi Rose RN  Outcome: Progressing  Goal: Establishment of infant feeding pattern  Description:  Postpartum OB-Pregnancy care plan goal which identifies if the mother is establishing a feeding pattern with their   2025 by Emily Pichardo RN  Outcome: Progressing  2025 0502 by Shadi Rose RN  Outcome: Progressing  Goal: Incisions, wounds, or drain sites healing without S/S of infection  2025 by Emily Pichardo RN  Outcome: Progressing  2025 0502 by Shadi Rose RN  Outcome: Progressing     Problem: Infection - Adult  Goal: Absence of infection at discharge  2025 by Emily Pichardo RN  Outcome: Progressing  2025 0502 by Shadi Rose

## 2025-02-28 NOTE — PLAN OF CARE
Problem: Pain  Goal: Verbalizes/displays adequate comfort level or baseline comfort level  2025 by Shadi Rose RN  Outcome: Progressing  2025 by Patricia Abraham RN  Outcome: Progressing     Problem: Safety - Adult  Goal: Free from fall injury  2025 by Shadi Rose RN  Outcome: Progressing  2025 by Patricia Abraham RN  Outcome: Progressing     Problem: Chronic Conditions and Co-morbidities  Goal: Patient's chronic conditions and co-morbidity symptoms are monitored and maintained or improved  2025 by Shadi Rose RN  Outcome: Progressing  2025 by Patricia Abraham RN  Outcome: Progressing     Problem: Postpartum  Goal: Experiences normal postpartum course  Description:  Postpartum OB-Pregnancy care plan goal which identifies if the mother is experiencing a normal postpartum course  Outcome: Progressing  Goal: Appropriate maternal -  bonding  Description:  Postpartum OB-Pregnancy care plan goal which identifies if the mother and  are bonding appropriately  Outcome: Progressing  Goal: Establishment of infant feeding pattern  Description:  Postpartum OB-Pregnancy care plan goal which identifies if the mother is establishing a feeding pattern with their   Outcome: Progressing  Goal: Incisions, wounds, or drain sites healing without S/S of infection  Outcome: Progressing     Problem: Infection - Adult  Goal: Absence of infection at discharge  Outcome: Progressing  Goal: Absence of infection during hospitalization  Outcome: Progressing  Goal: Absence of fever/infection during anticipated neutropenic period  Outcome: Progressing

## 2025-02-28 NOTE — PROGRESS NOTES
hematoma    Rh positive/Rubella immune   - Rhogam/MMR not indicated    Breast feeding   - Reviewed s/s mastitis    cHTN (no meds) w/ YOANNA w/o SF     -BP normotensive   - Denies s/s of PreE    - PreE labs wnl, P/C 0.39   - No PO or IV anti-hypertensives   - Will need BP check 3 days after discharge    Right Labial Hematoma    - 4cm x 2cm, non-expanding, stable after delivery  - Exam today shows resolution of hematoma   - Patient reports light lochia and no difficulty with urination   - Pain well controlled with Motrin and Tylenol   - Hgb 12.6>8.1   - Patient denies s/s anemia    Continue post partum care   - Anticipate discharge home today    Counseling Completed:  Secondary Smoke risks and Sudden Infant Death Syndrome were reviewed with recommendations. Infant sleeping, \"back to sleep\" and avoidance of co-sleeping recommendations were reviewed.  Signs and Symptoms of Post Partum Depression were reviewed. The patient is to call if any occur.  Signs and symptoms of Mastitis were reviewed. The patient is to call if any occur for follow up.  Discharge instructions including pelvic rest, no driving with pain medicine and office follow-up were reviewed with patient     Attending Physician: Dr. Lizz Nunez DO  Ob/Gyn Resident   3/1/2025, 6:09 AM

## 2025-02-28 NOTE — CARE COORDINATION
Social Work     Sw reviewed medical record (current active problem list) and tox screens and found no current concerns.     Sw spoke with mom and dad briefly to explain Sw role, inquire if any needs or concerns, and provide safe sleep education and discuss.  Mom denied any needs or questions and informs baby has a safe sleep environment (crib and bass).     Mom denied any current s/s of anxiety or depression and is aware to reach out to OB if any s/s occur after dc.     Mom reports a really good support system with family who resides nearby and denied any current questions or needs.      Mom reports this is her 1st baby, dad present and supportive to mom and baby.       Mom states ped will be [facility].      Sw encouraged parents to reach out if any issues or concerns arise.

## 2025-02-28 NOTE — CARE COORDINATION
CASE MANAGEMENT POST-PARTUM TRANSITIONAL CARE PLAN    39 weeks gestation of pregnancy [Z3A.39]    OB Provider: Dr. Itzel Singhr met w/ Crystal (FOB/BF Chirag asleep at bedside) at her bedside to discuss DCP. She is S/P  on 25 @ 39w6d at 1832 of female infant    Writer verified address, her phone number and emergency contacts are all correct on facesheet.. She states she lives with her BF/FOB Chirag. She denied barriers with transportation home, to doctor's appointments or with paying for medications upon discharge home.     [facility] insurance correct. Writer notified her she has 30 days from date of birth to add  to insurance policy by adding via Work[employer]. She verbalized understanding.    Infant name on BC: Sujatha Rodríguez.   Infant PCP Dr. Esvin Pantoja.     DME: None  HOME CARE: None    Anticipate DC home of couplet in private vehicle in 1-2 days status post vaginal delivery.    [facility] RISK OF UNPLANNED READMISSION 2.0             4.7 Total Score

## 2025-02-28 NOTE — L&D DELIVERY NOTE
Vaginal Delivery Note  Department of Obstetrics and Gynecology  [facility]       Patient: Kaleigh Boudreaux   : 1990  MRN: [id number]   Date of delivery: 25     Pre-operative Diagnosis: Kaleigh Boudreaux  at 39w6d  Spontaneous labor with spontaneous rupture of membranes and meconium stained fluid  cHTN (no meds) with Superimposed preeclampsia without severe features  Fetal VSD  BMI 25    Post-operative Diagnosis:   living infant female  Spontaneous labor with spontaneous rupture of membranes and meconium stained fluid  cHTN (no meds) with Superimposed preeclampsia without severe features  Fetal VSD  BMI 25    Delivering Obstetrician & Assistant(s): Dr. Itzel DO; Theresa Madrid DO    Infant Information:   Information for the patient's :  Kanika, Baby Pending Crystal [[id number]]      Information for the patient's :  Kanika, Baby Pending Crystal [[id number]]        Apgar scores: 8 at 1 minute and 9 at 5 minutes.     Anesthesia:  epidural anesthesia    Application and Delivery:    She was known to be GBS negative.    The patient progressed well, received an epidural, became complete and felt the urge to push. After pushing with contractions the fetal head delivered Cephalic, right occiput anterior over an intact perineum, nuchal cord was not present.  The anterior, then posterior shoulder delivered easily and atraumatically followed by the rest of the infant. Nose and mouth suctioned with bulb suction, infant was stimulated and dried. Cord was clamped and cut after one minute delayed cord clamping and infant was placed on maternal abdomen, and attended by BECCA Gomez for evaluation. The delivery of the placenta was spontaneous and appeared intact, whole, and that the umbilical cord had three vessels noted. Pitocin was started. The vagina was swept of all clots and debris. The perineum and vagina were evaluated and a left vaginal laceration was      Anesthesiologist     Nurse Anesthetist     Nurse Practitioner     Midwife     Nursery Nurse     Respiratory Therapist     Scrub Tech     Assistant Surgeon              [place] Assessment    Living Status: Living  Delivery Location Comment: 706        Skin Color:   Heart Rate:   Reflex Irritability:   Muscle Tone:   Respiratory Effort:   Total:            1 Minute:    0    2    2    2    2    8         5 Minute:    1    2    2    2    2    9                                        Apgars Assigned By: NICU/ ALINA LLOYD RN               Measurements

## 2025-02-28 NOTE — FLOWSHEET NOTE
Patient admitted to room 735 from L&D via wheelchair.   Oriented to room and surroundings.  Plan of care reviewed.  Verbalized understanding.  Instructed on infant security and safe sleep practices.  Preventing falls education provided .The following handouts given: A New Beginning: Your Guide to Postpartum Care, Rounding, gs Security System,Babies Cry A lot, Safe Sleep, Security and Visitation Guidelines.   Call light placed within reach.

## 2025-02-28 NOTE — CONSULTS
Breastfeeding education given and reviewed with pt. Pt reports a good feed at breast to start and then baby was coming on and off at night. She was given a nipple shield and it has only helped a little, but is still pinching. Assisted pt with latch and feed as baby is alert and showing cues. Attempted in laid back nursing position on  left side. Baby would do 1-2 minute bursts then fall off the breast. Changed to right breast and baby latched with assistance and sustained sucking, intermittent swallows noted. Encouraged pt to call out at any time. Reviewed how to hand express and first 24 hour expectations.

## 2025-03-01 VITALS
SYSTOLIC BLOOD PRESSURE: 136 MMHG | TEMPERATURE: 98.1 F | OXYGEN SATURATION: 99 % | DIASTOLIC BLOOD PRESSURE: 88 MMHG | HEART RATE: 80 BPM | RESPIRATION RATE: 16 BRPM

## 2025-03-01 PROCEDURE — 2500000003 HC RX 250 WO HCPCS

## 2025-03-01 PROCEDURE — 6370000000 HC RX 637 (ALT 250 FOR IP)

## 2025-03-01 RX ADMIN — ACETAMINOPHEN 1000 MG: 500 TABLET ORAL at 06:43

## 2025-03-01 RX ADMIN — SENNOSIDES AND DOCUSATE SODIUM 1 TABLET: 50; 8.6 TABLET ORAL at 09:59

## 2025-03-01 RX ADMIN — IBUPROFEN 600 MG: 600 TABLET ORAL at 13:47

## 2025-03-01 RX ADMIN — BENZOCAINE AND LEVOMENTHOL: 200; 5 SPRAY TOPICAL at 09:59

## 2025-03-01 RX ADMIN — IBUPROFEN 600 MG: 600 TABLET ORAL at 06:44

## 2025-03-01 RX ADMIN — SODIUM CHLORIDE, PRESERVATIVE FREE 5 ML: 5 INJECTION INTRAVENOUS at 10:00

## 2025-03-01 ASSESSMENT — PAIN DESCRIPTION - LOCATION: LOCATION: ABDOMEN;PERINEUM

## 2025-03-01 ASSESSMENT — PAIN SCALES - GENERAL
PAINLEVEL_OUTOF10: 2
PAINLEVEL_OUTOF10: 3

## 2025-03-01 ASSESSMENT — PAIN DESCRIPTION - DESCRIPTORS: DESCRIPTORS: SORE

## 2025-03-01 NOTE — LACTATION NOTE
Pt states feeds are going better and feels \"things are starting to click\".  Reviewed techniques for deep latch, pumping for return to work, discharge instructions, and LC follow up.

## 2025-03-01 NOTE — PLAN OF CARE
Problem: Pain  Goal: Verbalizes/displays adequate comfort level or baseline comfort level  3/1/2025 0428 by Abbie Dorman RN  Outcome: Progressing  2025 by Emily Pichardo RN  Outcome: Progressing     Problem: Safety - Adult  Goal: Free from fall injury  3/1/2025 0428 by Abbie Dorman RN  Outcome: Progressing  2025 by Emily Pichardo RN  Outcome: Progressing     Problem: Chronic Conditions and Co-morbidities  Goal: Patient's chronic conditions and co-morbidity symptoms are monitored and maintained or improved  3/1/2025 0428 by Abbie Dorman RN  Outcome: Progressing  2025 by Emily Pichardo RN  Outcome: Progressing     Problem: Postpartum  Goal: Experiences normal postpartum course  Description:  Postpartum OB-Pregnancy care plan goal which identifies if the mother is experiencing a normal postpartum course  3/1/2025 0428 by Abbie Dorman RN  Outcome: Progressing  2025 by Emily Pichardo RN  Outcome: Progressing  Goal: Appropriate maternal -  bonding  Description:  Postpartum OB-Pregnancy care plan goal which identifies if the mother and  are bonding appropriately  3/1/2025 0428 by Abbie Dorman RN  Outcome: Progressing  2025 by Emily Pichardo RN  Outcome: Progressing  Goal: Establishment of infant feeding pattern  Description:  Postpartum OB-Pregnancy care plan goal which identifies if the mother is establishing a feeding pattern with their   3/1/2025 0428 by Abbie Dorman RN  Outcome: Progressing  2025 by Emily Pichardo RN  Outcome: Progressing  Goal: Incisions, wounds, or drain sites healing without S/S of infection  3/1/2025 0428 by Abbie Dorman RN  Outcome: Progressing  2025 by Emily Pichardo RN  Outcome: Progressing     Problem: Infection - Adult  Goal: Absence of infection at discharge  3/1/2025 0428 by Abbie Dorman RN  Outcome: Progressing  2025 by Marino

## 2025-03-01 NOTE — LACTATION NOTE
Complains of worsening pain with feeds, using 20 mm nipple shield.  Rates pain 5/10.  Attempt to latch without and baby will not sustain suck. Labial and lingual restrictions noted, highly arched palate. Bruising and redness of nipples noted bilaterally.  Knows how to properly apply shield.  Worked on positioning, more comfortable in cross cradle.  Follow up appointment scheduled for two weeks.

## 2025-03-01 NOTE — PROGRESS NOTES
CLINICAL PHARMACY NOTE: MEDS TO BEDS    Total # of Prescriptions Filled: 4   The following medications were delivered to the patient:  Ibuprofen 600 mg  Ferosul 325 mg  Acetaminophen 500 mg  Senexon 8.6-50 mg    Additional Documentation:   Delivered to pt on 3/1 at 11:09 am. Pt had copay of 16.77 and paid via [employer].

## 2025-03-01 NOTE — CARE COORDINATION
Discharge Report    [facility]  Clinical Case Management Department  Written by: Cris Roque RN    Patient Name: Kaleigh Boudreaux  Attending Provider: Luc Robbins DO  Admit Date: 2025  7:53 PM  MRN: [id number]  Account: [id number]                     : 1990  Discharge Date: 3/1/2025    Disposition: home    Cris Roque RN

## 2025-03-02 NOTE — FLOWSHEET NOTE
Discharge instructions given, all questions answered.  I have reviewed all [facility] Post-Birth Warning Signs and essential teaching points for pulmonary embolism, cardiac disease, hypertensive disorders of pregnancy, obstetric hemorrhage, venous thromboembolism, infection, and postpartum depression with the patient and significant other (support person) . I have informed the patient on when to call their healthcare provider and when to call 911. I have discussed with the patient  the importance of scheduling a follow-up visit with their physician, nurse practitioner or midwife and provided them with correct contact information for appointment. I have provided the patient with a copy of the \"Save Your Life\" handout. The patient has acknowledged receiving and understanding this education with her signature.

## 2025-03-03 ENCOUNTER — CARE COORDINATION (OUTPATIENT)
Dept: OTHER | Facility: CLINIC | Age: 35
End: 2025-03-03

## 2025-03-03 LAB — SURGICAL PATHOLOGY REPORT: NORMAL

## 2025-03-05 ENCOUNTER — CARE COORDINATION (OUTPATIENT)
Dept: OTHER | Facility: CLINIC | Age: 35
End: 2025-03-05

## 2025-03-05 NOTE — CARE COORDINATION
work planning? Yes      Postpartum Assessment:  Vaginal  Lochia-normal/light clots discussed how to manage and what symptoms to report and when  Fever No  BM? Yes   Decreased urinary output No  Perineal care-yes 1st degree tear  Visual changes No  Calf pain No  Headache No  Swelling Yes  Breast Pain No  Depression or feelings of sadness or overwhelm-pt reports feeling good and she has good support at home  Breast Feeding Yes  Feeding schedule-q1.5-2h  Sleep safety  Infant's weight  Patient stated delivery experience: good  Risk factors for ED visit or readmission: preeclampsia, first baby    Patient given an opportunity to ask questions and does not have any further questions or concerns at this time. Were discharge instructions available to patient? Yes. Reviewed appropriate site of care based on symptoms and resources available to patient including: When to call 911  Condition related references. The patient agrees to contact the OB/Gyn office for questions related to their healthcare.    Plan for follow-up call in 10-14 days based on severity of symptoms and risk factors.  Plan for next call: self management-pp care breast feeding, girl check on  and if specialist needed.      underbite  2.5h

## 2025-03-11 NOTE — ADDENDUM NOTE
Addendum  created 03/11/25 1008 by Julisa Hernandez MD    Review and Sign - Ready for Procedure

## 2025-03-12 ENCOUNTER — POSTPARTUM VISIT (OUTPATIENT)
Dept: OBGYN CLINIC | Age: 35
End: 2025-03-12

## 2025-03-12 VITALS
SYSTOLIC BLOOD PRESSURE: 136 MMHG | WEIGHT: 137.25 LBS | DIASTOLIC BLOOD PRESSURE: 74 MMHG | BODY MASS INDEX: 23.56 KG/M2

## 2025-03-12 DIAGNOSIS — O10.919 CHRONIC HYPERTENSION AFFECTING PREGNANCY: ICD-10-CM

## 2025-03-12 PROCEDURE — 99024 POSTOP FOLLOW-UP VISIT: CPT | Performed by: STUDENT IN AN ORGANIZED HEALTH CARE EDUCATION/TRAINING PROGRAM

## 2025-03-12 RX ORDER — LABETALOL 200 MG/1
200 TABLET, FILM COATED ORAL 2 TIMES DAILY
Qty: 60 TABLET | Refills: 1 | Status: SHIPPED | OUTPATIENT
Start: 2025-03-12

## 2025-03-12 ASSESSMENT — ANXIETY QUESTIONNAIRES
6. BECOMING EASILY ANNOYED OR IRRITABLE: SEVERAL DAYS
5. BEING SO RESTLESS THAT IT IS HARD TO SIT STILL: NOT AT ALL
IF YOU CHECKED OFF ANY PROBLEMS ON THIS QUESTIONNAIRE, HOW DIFFICULT HAVE THESE PROBLEMS MADE IT FOR YOU TO DO YOUR WORK, TAKE CARE OF THINGS AT HOME, OR GET ALONG WITH OTHER PEOPLE: NOT DIFFICULT AT ALL
3. WORRYING TOO MUCH ABOUT DIFFERENT THINGS: SEVERAL DAYS
4. TROUBLE RELAXING: NOT AT ALL
2. NOT BEING ABLE TO STOP OR CONTROL WORRYING: SEVERAL DAYS
GAD7 TOTAL SCORE: 5
7. FEELING AFRAID AS IF SOMETHING AWFUL MIGHT HAPPEN: SEVERAL DAYS
1. FEELING NERVOUS, ANXIOUS, OR ON EDGE: SEVERAL DAYS

## 2025-03-12 NOTE — PROGRESS NOTES
Postpartum Visit    Kaleigh Boudreaux is a 34 y.o. female , Post Partum    The patient was seen. She has no chief complaint today.    She is  breast feeding and denies signs or symptoms of mastitis.  The patient completed the E.P.D.S. Post Partum Depression Evaluation form and EPDS Score of 7. She does not have signs or symptoms of post partum depression. She denies any suicidal thoughts with a plan, intent to harm others, and delusional ideas. She does admit to having good home support. Today her lochia is light she denies any dizziness or shortness of breath.  Her bowels are regular and she denies any urinary tract symptomology. She is using abstinence for contraception at this time. She is considering contraceptive management. We discussed all forms of birth control.     She was seen and counseled on all forms of birth control both male and female, reversible and none for control of heavy or painful periods or contraceptive purposes.  Discussed possible increased risk of developing a blood clot which may increase morbidity and mortality.  Discussed that smoking while on contraception may increase this risk and all patients are encouraged to stop use of tobacco products.  She was instructed on barrier contraception for STD prevention.  The life-threatening side effect profile was reviewed in detail and the patient was instructed that if these occur to stop using the medication and report to the emergency department or call 911. She denies a personal or family history of blood clots in the lungs or legs, stroke, TIA, migraines with aura, or sudden cardiac death.  Contraindications to estrogen: no (no contraindications after 6 wk PP)      Her pregnancy was complicated by:  Patient Active Problem List    Diagnosis Date Noted    Right labial hematoma 2025     Priority: High     Overview Note:     After       Anemia 2025     25 M Apg 8/9 Wt 7#6 2025    Fetal VSD 10/15/2024

## 2025-03-19 ENCOUNTER — CARE COORDINATION (OUTPATIENT)
Dept: OTHER | Facility: CLINIC | Age: 35
End: 2025-03-19

## 2025-04-03 ENCOUNTER — OFFICE VISIT (OUTPATIENT)
Dept: OBGYN CLINIC | Age: 35
End: 2025-04-03

## 2025-04-03 ENCOUNTER — HOSPITAL ENCOUNTER (OUTPATIENT)
Age: 35
Setting detail: SPECIMEN
Discharge: HOME | End: 2025-04-03

## 2025-04-03 DIAGNOSIS — N89.8 VAGINAL ODOR: Primary | ICD-10-CM

## 2025-04-03 DIAGNOSIS — M62.89 PELVIC FLOOR DYSFUNCTION: ICD-10-CM

## 2025-04-03 DIAGNOSIS — N89.8 VAGINAL ODOR: ICD-10-CM

## 2025-04-03 DIAGNOSIS — R10.2 PELVIC PRESSURE IN FEMALE: ICD-10-CM

## 2025-04-03 DIAGNOSIS — R10.2 PELVIC PAIN: ICD-10-CM

## 2025-04-04 PROBLEM — M62.89 PELVIC FLOOR DYSFUNCTION: Status: ACTIVE | Noted: 2025-04-04

## 2025-04-04 PROBLEM — R10.2 PELVIC PAIN: Status: ACTIVE | Noted: 2025-04-04

## 2025-04-04 LAB
CANDIDA SPECIES: NEGATIVE
GARDNERELLA VAGINALIS: NEGATIVE
MICROORGANISM SPEC CULT: NO GROWTH
SERVICE CMNT-IMP: NORMAL
SOURCE: NORMAL
SPECIMEN DESCRIPTION: NORMAL
TRICHOMONAS: NEGATIVE

## 2025-04-04 NOTE — PROGRESS NOTES
Kaleigh Boudreaux  2025    YOB: 1990    The patient was seen today. She is here regarding concern for pelvic organ prolapse. Her bowels are regular and she is voiding without difficulty.     HPI:  Kaleigh Boudreaux is a 34 y.o. female       25  She states that 4-5 days ago she was bending and squatting down and she had pressure in pelvis and discomfort/pressure.    She states she has had a vaginal odor and a achy feeling since that time.     She denies heavy bleeding, she denies significant bowel or bladder dysfunction.      OB History    Para Term  AB Living   1 1 1 0 0 1   SAB IAB Ectopic Molar Multiple Live Births   0 0 0 0 0 1      # Outcome Date GA Lbr Wagner/2nd Weight Sex Type Anes PTL Lv   1 Term 25 39w6d / 01:49 3.37 kg (7 lb 6.9 oz) F Vag-Spont EPI N VANDA      Name: Sujatha Rodríguez      Apgar1: 8  Apgar5: 9       Past Medical History:   Diagnosis Date    Depression     Seasonal allergies        Past Surgical History:   Procedure Laterality Date    TONSILLECTOMY      WISDOM TOOTH EXTRACTION         Family History   Problem Relation Age of Onset    Other Mother         IBS    Heart Disease Father     Stroke Father     No Known Problems Brother     Breast Cancer Maternal Grandmother     Stroke Paternal Grandfather        Social History     Socioeconomic History    Marital status: Single     Spouse name: Not on file    Number of children: Not on file    Years of education: Not on file    Highest education level: Not on file   Occupational History    Not on file   Tobacco Use    Smoking status: Never    Smokeless tobacco: Never   Vaping Use    Vaping status: Never Used   Substance and Sexual Activity    Alcohol use: Not Currently     Comment: rare    Drug use: Not Currently     Types: Marijuana (Weed)     Comment: \"last used 9mos ago\"  2024    Sexual activity: Yes     Partners: Male   Other Topics Concern    Not on file   Social History

## 2025-04-06 ENCOUNTER — RESULTS FOLLOW-UP (OUTPATIENT)
Dept: OBGYN CLINIC | Age: 35
End: 2025-04-06

## 2025-04-09 ENCOUNTER — POSTPARTUM VISIT (OUTPATIENT)
Dept: OBGYN CLINIC | Age: 35
End: 2025-04-09

## 2025-04-09 VITALS
HEIGHT: 64 IN | BODY MASS INDEX: 23.73 KG/M2 | DIASTOLIC BLOOD PRESSURE: 82 MMHG | SYSTOLIC BLOOD PRESSURE: 128 MMHG | WEIGHT: 139 LBS

## 2025-04-09 PROCEDURE — 0503F POSTPARTUM CARE VISIT: CPT | Performed by: ADVANCED PRACTICE MIDWIFE

## 2025-04-09 ASSESSMENT — ANXIETY QUESTIONNAIRES
2. NOT BEING ABLE TO STOP OR CONTROL WORRYING: NOT AT ALL
1. FEELING NERVOUS, ANXIOUS, OR ON EDGE: NOT AT ALL
4. TROUBLE RELAXING: SEVERAL DAYS
5. BEING SO RESTLESS THAT IT IS HARD TO SIT STILL: NOT AT ALL
7. FEELING AFRAID AS IF SOMETHING AWFUL MIGHT HAPPEN: NOT AT ALL
IF YOU CHECKED OFF ANY PROBLEMS ON THIS QUESTIONNAIRE, HOW DIFFICULT HAVE THESE PROBLEMS MADE IT FOR YOU TO DO YOUR WORK, TAKE CARE OF THINGS AT HOME, OR GET ALONG WITH OTHER PEOPLE: SOMEWHAT DIFFICULT
3. WORRYING TOO MUCH ABOUT DIFFERENT THINGS: SEVERAL DAYS
GAD7 TOTAL SCORE: 3
6. BECOMING EASILY ANNOYED OR IRRITABLE: SEVERAL DAYS

## 2025-04-09 NOTE — PROGRESS NOTES
[facility] [address]  [place] [id number]  Dept: [phone]    Patient Name: Kaleigh Boudreaux  Patient Age: 34 y.o.  Date of Visit: 2025    Chief Complaint   Patient presents with    Postpartum Care       HPI:  DELIVERY DATE: 25  TYPE OF DELIVERY: normal spontaneous vaginal delivery  PROVIDER:   PERINEUM: left vaginal laceration repaired   Delivery/Postpartum Complications: cHTN w/ YOANNA w/o SF. Stable labial hematoma.    Kaleigh was seen for her 2 week visit.  Kaleigh Boudreaux does report any concerns today. Discuss prolapse , just got call from pelvic floor therapy overall very achy with activity.    Her Female infant is healthy.   Infant feeding:  [] Breastfeeding w/o issues  [x] Breastfeeding w/ issues baby doesn't have the best latch but is growing. Some more pain on the left vs right but overall pain has gotten better.  [] Exclusively pumping   [] Pumping and breastfeeding  Infant feeding concerns: Yes thinks has under bite which is affecting latch.    She is experiencing pain.  She is rating her pain 4- lower back  She reports her lochia is no bleeding  She reports mild perineal discomfort.- recently dx prolapse  She denies urinary incontinence.  Her bowels have returned to her normal pattern.  She is back to her normal activity pattern.  Has had first menses postpartum: no  Kaleigh has not engaged in intercourse.    [place] protected: NA  She does not report a mood disorder.    She feels she is not having difficulty coping.  Kaleigh feels her family adjustment is effective.  She reports an overall positive birth experience.       Postpartum Depression: Low Risk  (2025)    [place]  Depression Scale     Last EPDS Total Score: 3     Last EPDS Self Harm Result: Never         3/12/2025     2:53 PM 2024    11:12 AM   MAXX-7 SCREENING   Feeling nervous, anxious, or on edge

## 2025-04-30 ENCOUNTER — CARE COORDINATION (OUTPATIENT)
Dept: OTHER | Facility: CLINIC | Age: 35
End: 2025-04-30

## 2025-06-18 LAB
CHOLEST SERPL-MCNC: 212 MG/DL (ref 0–199)
CHOLESTEROL/HDL RATIO: 2.6
GLUCOSE SERPL-MCNC: 88 MG/DL (ref 74–99)
HDLC SERPL-MCNC: 83 MG/DL
LDLC SERPL CALC-MCNC: 117 MG/DL (ref 0–100)
PATIENT FASTING?: YES
TRIGL SERPL-MCNC: 59 MG/DL
VLDLC SERPL CALC-MCNC: 12 MG/DL (ref 1–30)

## 2025-07-23 ENCOUNTER — OFFICE VISIT (OUTPATIENT)
Dept: FAMILY MEDICINE CLINIC | Age: 35
End: 2025-07-23
Payer: COMMERCIAL

## 2025-07-23 ENCOUNTER — HOSPITAL ENCOUNTER (OUTPATIENT)
Age: 35
Setting detail: SPECIMEN
Discharge: HOME | End: 2025-07-23

## 2025-07-23 VITALS
BODY MASS INDEX: 25.61 KG/M2 | TEMPERATURE: 98.3 F | DIASTOLIC BLOOD PRESSURE: 84 MMHG | SYSTOLIC BLOOD PRESSURE: 132 MMHG | OXYGEN SATURATION: 98 % | WEIGHT: 150 LBS | HEART RATE: 76 BPM | HEIGHT: 64 IN

## 2025-07-23 DIAGNOSIS — R53.83 FATIGUE, UNSPECIFIED TYPE: ICD-10-CM

## 2025-07-23 DIAGNOSIS — F41.9 ANXIETY AND DEPRESSION: Primary | ICD-10-CM

## 2025-07-23 DIAGNOSIS — F41.9 ANXIETY AND DEPRESSION: ICD-10-CM

## 2025-07-23 DIAGNOSIS — F32.A ANXIETY AND DEPRESSION: Primary | ICD-10-CM

## 2025-07-23 DIAGNOSIS — F32.A ANXIETY AND DEPRESSION: ICD-10-CM

## 2025-07-23 LAB
25(OH)D3 SERPL-MCNC: 41.8 NG/ML (ref 30–100)
ALBUMIN SERPL-MCNC: 4.7 G/DL (ref 3.5–5.2)
ALBUMIN/GLOB SERPL: 1.7 {RATIO} (ref 1–2.5)
ALP SERPL-CCNC: 117 U/L (ref 35–104)
ALT SERPL-CCNC: 30 U/L (ref 10–35)
ANION GAP SERPL CALCULATED.3IONS-SCNC: 12 MMOL/L (ref 9–16)
AST SERPL-CCNC: 33 U/L (ref 10–35)
BASOPHILS # BLD: 0.03 K/UL (ref 0–0.2)
BASOPHILS NFR BLD: 0 % (ref 0–2)
BILIRUB SERPL-MCNC: 0.6 MG/DL (ref 0–1.2)
BUN SERPL-MCNC: 21 MG/DL (ref 6–20)
CALCIUM SERPL-MCNC: 9.9 MG/DL (ref 8.6–10.4)
CHLORIDE SERPL-SCNC: 103 MMOL/L (ref 98–107)
CO2 SERPL-SCNC: 25 MMOL/L (ref 20–31)
CREAT SERPL-MCNC: 0.9 MG/DL (ref 0.6–0.9)
EOSINOPHIL # BLD: 0.26 K/UL (ref 0–0.44)
EOSINOPHILS RELATIVE PERCENT: 4 % (ref 1–4)
ERYTHROCYTE [DISTWIDTH] IN BLOOD BY AUTOMATED COUNT: 12 % (ref 11.8–14.4)
GFR, ESTIMATED: 87 ML/MIN/1.73M2
GLUCOSE SERPL-MCNC: 72 MG/DL (ref 74–99)
HCT VFR BLD AUTO: 41.7 % (ref 36.3–47.1)
HGB BLD-MCNC: 13.5 G/DL (ref 11.9–15.1)
IMM GRANULOCYTES # BLD AUTO: <0.03 K/UL (ref 0–0.3)
IMM GRANULOCYTES NFR BLD: 0 %
IRON SATN MFR SERPL: 26 % (ref 20–55)
IRON SERPL-MCNC: 87 UG/DL (ref 37–145)
LYMPHOCYTES NFR BLD: 2.11 K/UL (ref 1.1–3.7)
LYMPHOCYTES RELATIVE PERCENT: 30 % (ref 24–43)
MCH RBC QN AUTO: 30.1 PG (ref 25.2–33.5)
MCHC RBC AUTO-ENTMCNC: 32.4 G/DL (ref 28.4–34.8)
MCV RBC AUTO: 92.9 FL (ref 82.6–102.9)
MONOCYTES NFR BLD: 0.65 K/UL (ref 0.1–1.2)
MONOCYTES NFR BLD: 9 % (ref 3–12)
NEUTROPHILS NFR BLD: 57 % (ref 36–65)
NEUTS SEG NFR BLD: 4.08 K/UL (ref 1.5–8.1)
NRBC BLD-RTO: 0 PER 100 WBC
PLATELET # BLD AUTO: 292 K/UL (ref 138–453)
PMV BLD AUTO: 11.3 FL (ref 8.1–13.5)
POTASSIUM SERPL-SCNC: 4.6 MMOL/L (ref 3.7–5.3)
PROT SERPL-MCNC: 7.4 G/DL (ref 6.6–8.7)
RBC # BLD AUTO: 4.49 M/UL (ref 3.95–5.11)
SODIUM SERPL-SCNC: 140 MMOL/L (ref 136–145)
TIBC SERPL-MCNC: 330 UG/DL (ref 250–450)
TSH SERPL DL<=0.05 MIU/L-ACNC: 1.52 UIU/ML (ref 0.27–4.2)
UNSATURATED IRON BINDING CAPACITY: 243 UG/DL (ref 112–347)
VIT B12 SERPL-MCNC: 933 PG/ML (ref 232–1245)
WBC OTHER # BLD: 7.1 K/UL (ref 3.5–11.3)

## 2025-07-23 PROCEDURE — 99214 OFFICE O/P EST MOD 30 MIN: CPT | Performed by: PHYSICIAN ASSISTANT

## 2025-07-23 RX ORDER — SERTRALINE HYDROCHLORIDE 25 MG/1
25 TABLET, FILM COATED ORAL DAILY
Qty: 30 TABLET | Refills: 1 | Status: SHIPPED | OUTPATIENT
Start: 2025-07-23

## 2025-07-23 ASSESSMENT — PATIENT HEALTH QUESTIONNAIRE - PHQ9
8. MOVING OR SPEAKING SO SLOWLY THAT OTHER PEOPLE COULD HAVE NOTICED. OR THE OPPOSITE - BEING SO FIDGETY OR RESTLESS THAT YOU HAVE BEEN MOVING AROUND A LOT MORE THAN USUAL: NOT AT ALL
9. THOUGHTS THAT YOU WOULD BE BETTER OFF DEAD, OR OF HURTING YOURSELF: SEVERAL DAYS
SUM OF ALL RESPONSES TO PHQ QUESTIONS 1-9: 12
4. FEELING TIRED OR HAVING LITTLE ENERGY: NEARLY EVERY DAY
SUM OF ALL RESPONSES TO PHQ QUESTIONS 1-9: 13
10. IF YOU CHECKED OFF ANY PROBLEMS, HOW DIFFICULT HAVE THESE PROBLEMS MADE IT FOR YOU TO DO YOUR WORK, TAKE CARE OF THINGS AT HOME, OR GET ALONG WITH OTHER PEOPLE: VERY DIFFICULT
3. TROUBLE FALLING OR STAYING ASLEEP: MORE THAN HALF THE DAYS
1. LITTLE INTEREST OR PLEASURE IN DOING THINGS: MORE THAN HALF THE DAYS
1. LITTLE INTEREST OR PLEASURE IN DOING THINGS: MORE THAN HALF THE DAYS
SUM OF ALL RESPONSES TO PHQ QUESTIONS 1-9: 13
SUM OF ALL RESPONSES TO PHQ QUESTIONS 1-9: 13
8. MOVING OR SPEAKING SO SLOWLY THAT OTHER PEOPLE COULD HAVE NOTICED. OR THE OPPOSITE, BEING SO FIGETY OR RESTLESS THAT YOU HAVE BEEN MOVING AROUND A LOT MORE THAN USUAL: NOT AT ALL
9. THOUGHTS THAT YOU WOULD BE BETTER OFF DEAD, OR OF HURTING YOURSELF: SEVERAL DAYS
4. FEELING TIRED OR HAVING LITTLE ENERGY: NEARLY EVERY DAY
10. IF YOU CHECKED OFF ANY PROBLEMS, HOW DIFFICULT HAVE THESE PROBLEMS MADE IT FOR YOU TO DO YOUR WORK, TAKE CARE OF THINGS AT HOME, OR GET ALONG WITH OTHER PEOPLE: VERY DIFFICULT
3. TROUBLE FALLING OR STAYING ASLEEP: MORE THAN HALF THE DAYS
5. POOR APPETITE OR OVEREATING: NOT AT ALL
SUM OF ALL RESPONSES TO PHQ QUESTIONS 1-9: 13
7. TROUBLE CONCENTRATING ON THINGS, SUCH AS READING THE NEWSPAPER OR WATCHING TELEVISION: SEVERAL DAYS
2. FEELING DOWN, DEPRESSED OR HOPELESS: MORE THAN HALF THE DAYS
6. FEELING BAD ABOUT YOURSELF - OR THAT YOU ARE A FAILURE OR HAVE LET YOURSELF OR YOUR FAMILY DOWN: MORE THAN HALF THE DAYS
5. POOR APPETITE OR OVEREATING: NOT AT ALL
6. FEELING BAD ABOUT YOURSELF - OR THAT YOU ARE A FAILURE OR HAVE LET YOURSELF OR YOUR FAMILY DOWN: MORE THAN HALF THE DAYS
2. FEELING DOWN, DEPRESSED OR HOPELESS: MORE THAN HALF THE DAYS
7. TROUBLE CONCENTRATING ON THINGS, SUCH AS READING THE NEWSPAPER OR WATCHING TELEVISION: SEVERAL DAYS

## 2025-07-23 ASSESSMENT — COLUMBIA-SUICIDE SEVERITY RATING SCALE - C-SSRS
4. IN THE PAST MONTH, HAVE YOU HAD THESE THOUGHTS AND HAD SOME INTENTION OF ACTING ON THEM?: NO
1. IN THE PAST MONTH, HAVE YOU WISHED YOU WERE DEAD OR WISHED YOU COULD GO TO SLEEP AND NOT WAKE UP?: NO
7. DID THIS OCCUR IN THE LAST THREE MONTHS: NO
2. IN THE PAST MONTH, HAVE YOU ACTUALLY HAD ANY THOUGHTS OF KILLING YOURSELF?: YES
5. IN THE PAST MONTH, HAVE YOU STARTED TO WORK OUT OR WORKED OUT THE DETAILS OF HOW TO KILL YOURSELF? DO YOU INTEND TO CARRY OUT THIS PLAN?: NO
6. IN YOUR LIFETIME, HAVE YOU EVER DONE ANYTHING, STARTED TO DO ANYTHING, OR PREPARED TO DO ANYTHING TO END YOUR LIFE?: YES
3. IN THE PAST MONTH, HAVE YOU BEEN THINKING ABOUT HOW YOU MIGHT KILL YOURSELF?: NO

## 2025-07-23 ASSESSMENT — ENCOUNTER SYMPTOMS
COLOR CHANGE: 0
TROUBLE SWALLOWING: 0
SHORTNESS OF BREATH: 0

## 2025-07-23 NOTE — PROGRESS NOTES
[facility]  [facility]  [address] [address]  Dept: [phone]  Dept Fax: [phone]    Kaleigh Boudreaux is a 34 y.o. female who presents today for her medical conditions/complaintsas noted below.  Kaeligh Boudreaux is c/o of   Chief Complaint   Patient presents with    Fatigue     5 months post pardom    Depression     See depression screening- scored high        HPI:     HPI    Patient is here today reporting she had a baby in Feb 2025 and is still breast feeding. Patient states over the last month she has noticed increase in anxiety and starting to feel symptoms of depression. She reports she has had depression when in her teens. Was on wellbutrin in the past.  Patient states she feels she is getting sleep but it is still broken up with the baby. Feels tired often. She does have support from her boyfriend and family. She is living with her boyfriend and her daughter. Reports feeling safe.   Did not deal with depression immediately after delivery, symptoms are new over the last month  Patient denies any suicidal plans or intentions. Denies any homicidal thoughts  She reports doing yoga, getting outside or eating better helps with symptoms. She is not presently seeing a counselor but is open to that.   Patient states friends and family live by her and are very available.   She is interested in starting medication treatment to help current symptoms. Would like to try something other than wellbutrin    PHQ-9: 13, moderate risk    No results found for: \"LABA1C\"          ( goal A1Cis < 7)   No components found for: \"LABMICR\"  No components found for: \"LDLCHOLESTEROL\", \"LDLCALC\"    (goal LDL is <100)   AST (U/L)   Date Value   02/26/2025 30     ALT (U/L)   Date Value   02/26/2025 20     BUN (mg/dL)   Date Value   02/26/2025 11     BP Readings from Last 3 Encounters:   07/23/25 132/84   04/09/25 128/82   03/12/25 136/74          (goal 120/80)    Past Medical

## 2025-08-11 ENCOUNTER — OFFICE VISIT (OUTPATIENT)
Dept: FAMILY MEDICINE CLINIC | Age: 35
End: 2025-08-11
Payer: COMMERCIAL

## 2025-08-11 VITALS
HEART RATE: 89 BPM | SYSTOLIC BLOOD PRESSURE: 118 MMHG | WEIGHT: 149 LBS | DIASTOLIC BLOOD PRESSURE: 72 MMHG | TEMPERATURE: 97.8 F | HEIGHT: 64 IN | OXYGEN SATURATION: 98 % | BODY MASS INDEX: 25.44 KG/M2

## 2025-08-11 DIAGNOSIS — F32.A ANXIETY AND DEPRESSION: Primary | ICD-10-CM

## 2025-08-11 DIAGNOSIS — F41.9 ANXIETY AND DEPRESSION: Primary | ICD-10-CM

## 2025-08-11 PROCEDURE — 99213 OFFICE O/P EST LOW 20 MIN: CPT | Performed by: PHYSICIAN ASSISTANT

## 2025-08-11 ASSESSMENT — ENCOUNTER SYMPTOMS: COLOR CHANGE: 0

## 2025-08-29 ENCOUNTER — PATIENT MESSAGE (OUTPATIENT)
Dept: FAMILY MEDICINE CLINIC | Age: 35
End: 2025-08-29